# Patient Record
Sex: FEMALE | Race: WHITE | Employment: OTHER | RURAL
[De-identification: names, ages, dates, MRNs, and addresses within clinical notes are randomized per-mention and may not be internally consistent; named-entity substitution may affect disease eponyms.]

---

## 2017-04-10 ENCOUNTER — HOSPITAL ENCOUNTER (OUTPATIENT)
Dept: OTHER | Age: 57
Discharge: HOME | End: 2017-04-10
Attending: NURSE PRACTITIONER | Admitting: NURSE PRACTITIONER

## 2017-04-10 LAB
A/G RATIO: 2 (ref 0.8–2)
ALBUMIN SERPL-MCNC: 4.5 G/DL (ref 3.4–4.8)
ALP BLD-CCNC: 81 U/L (ref 25–100)
ALT SERPL-CCNC: 30 U/L (ref 4–36)
ANION GAP SERPL CALCULATED.3IONS-SCNC: 13 MMOL/L (ref 3–16)
AST SERPL-CCNC: 18 U/L (ref 8–33)
BASOPHILS ABSOLUTE: 0 K/UL (ref 0–0.1)
BASOPHILS RELATIVE PERCENT: 0.4 %
BILIRUB SERPL-MCNC: 0.3 MG/DL (ref 0.3–1.2)
BUN BLDV-MCNC: 13 MG/DL (ref 6–20)
CALCIUM SERPL-MCNC: 9.9 MG/DL (ref 8.5–10.5)
CHLORIDE BLD-SCNC: 103 MMOL/L (ref 98–107)
CHOLESTEROL, TOTAL: 152 MG/DL (ref 0–200)
CO2: 26 MMOL/L (ref 20–30)
CREAT SERPL-MCNC: 1 MG/DL (ref 0.4–1.2)
EOSINOPHILS ABSOLUTE: 0.5 K/UL (ref 0–0.4)
EOSINOPHILS RELATIVE PERCENT: 4.5 %
FOLATE: >20 NG/ML
GFR AFRICAN AMERICAN: >59
GFR NON-AFRICAN AMERICAN: 58
GLOBULIN: 2.3 G/DL
GLUCOSE BLD-MCNC: 96 MG/DL (ref 74–106)
HCT VFR BLD CALC: 37.2 % (ref 37–47)
HDLC SERPL-MCNC: 42 MG/DL (ref 40–60)
HEMOGLOBIN: 12.3 G/DL (ref 11.5–16.5)
LDL CHOLESTEROL CALCULATED: 67 MG/DL
LYMPHOCYTES ABSOLUTE: 3.2 K/UL (ref 1.5–4)
LYMPHOCYTES RELATIVE PERCENT: 30.6 % (ref 20–40)
MCH RBC QN AUTO: 31.4 PG (ref 27–32)
MCHC RBC AUTO-ENTMCNC: 33.1 G/DL (ref 31–35)
MCV RBC AUTO: 94.9 FL (ref 80–100)
MONOCYTES ABSOLUTE: 0.7 K/UL (ref 0.2–0.8)
MONOCYTES RELATIVE PERCENT: 6.3 % (ref 3–10)
NEUTROPHILS ABSOLUTE: 6.2 K/UL (ref 2–7.5)
NEUTROPHILS RELATIVE PERCENT: 58.2 %
PDW BLD-RTO: 15.6 % (ref 11–16)
PLATELET # BLD: 262 K/UL (ref 150–400)
PMV BLD AUTO: 10.3 FL (ref 6–10)
POTASSIUM SERPL-SCNC: 4.5 MMOL/L (ref 3.4–5.1)
RBC # BLD: 3.92 M/UL (ref 3.8–5.8)
SODIUM BLD-SCNC: 142 MMOL/L (ref 136–145)
TOTAL PROTEIN: 6.8 G/DL (ref 6.4–8.3)
TRIGL SERPL-MCNC: 213 MG/DL (ref 0–249)
TSH SERPL DL<=0.05 MIU/L-ACNC: 3.21 UIU/ML (ref 0.35–5.5)
VITAMIN B-12: 398 PG/ML (ref 211–911)
VLDLC SERPL CALC-MCNC: 43 MG/DL
WBC # BLD: 10.6 K/UL (ref 4–11)

## 2017-08-30 ENCOUNTER — HOSPITAL ENCOUNTER (OUTPATIENT)
Dept: OTHER | Age: 57
Discharge: HOME | End: 2017-08-30
Attending: NURSE PRACTITIONER | Admitting: NURSE PRACTITIONER

## 2017-08-30 DIAGNOSIS — M54.5 LOW BACK PAIN, UNSPECIFIED BACK PAIN LATERALITY, UNSPECIFIED CHRONICITY, WITH SCIATICA PRESENCE UNSPECIFIED: ICD-10-CM

## 2017-08-30 LAB
A/G RATIO: 1.8 (ref 0.8–2)
ALBUMIN SERPL-MCNC: 4.3 G/DL (ref 3.4–4.8)
ALP BLD-CCNC: 73 U/L (ref 25–100)
ALT SERPL-CCNC: 25 U/L (ref 4–36)
ANION GAP SERPL CALCULATED.3IONS-SCNC: 14 MMOL/L (ref 3–16)
AST SERPL-CCNC: 19 U/L (ref 8–33)
BASOPHILS ABSOLUTE: 0.1 K/UL (ref 0–0.1)
BASOPHILS RELATIVE PERCENT: 0.7 %
BILIRUB SERPL-MCNC: <0.2 MG/DL (ref 0.3–1.2)
BUN BLDV-MCNC: 12 MG/DL (ref 6–20)
CALCIUM SERPL-MCNC: 9.5 MG/DL (ref 8.5–10.5)
CHLORIDE BLD-SCNC: 100 MMOL/L (ref 98–107)
CHOLESTEROL, TOTAL: 218 MG/DL (ref 0–200)
CO2: 25 MMOL/L (ref 20–30)
CREAT SERPL-MCNC: 0.8 MG/DL (ref 0.4–1.2)
EOSINOPHILS ABSOLUTE: 0.5 K/UL (ref 0–0.4)
EOSINOPHILS RELATIVE PERCENT: 5.1 %
FOLATE: >20 NG/ML
GFR AFRICAN AMERICAN: >59
GFR NON-AFRICAN AMERICAN: >60
GLOBULIN: 2.4 G/DL
GLUCOSE BLD-MCNC: 89 MG/DL (ref 74–106)
HCT VFR BLD CALC: 37.3 % (ref 37–47)
HDLC SERPL-MCNC: 40 MG/DL (ref 40–60)
HEMOGLOBIN: 12.3 G/DL (ref 11.5–16.5)
LDL CHOLESTEROL CALCULATED: 104 MG/DL
LYMPHOCYTES ABSOLUTE: 3.1 K/UL (ref 1.5–4)
LYMPHOCYTES RELATIVE PERCENT: 34.7 % (ref 20–40)
MCH RBC QN AUTO: 30.1 PG (ref 27–32)
MCHC RBC AUTO-ENTMCNC: 33 G/DL (ref 31–35)
MCV RBC AUTO: 91.4 FL (ref 80–100)
MONOCYTES ABSOLUTE: 0.5 K/UL (ref 0.2–0.8)
MONOCYTES RELATIVE PERCENT: 5.5 % (ref 3–10)
NEUTROPHILS ABSOLUTE: 4.8 K/UL (ref 2–7.5)
NEUTROPHILS RELATIVE PERCENT: 54 %
PDW BLD-RTO: 15.1 % (ref 11–16)
PLATELET # BLD: 242 K/UL (ref 150–400)
PMV BLD AUTO: 10.3 FL (ref 6–10)
POTASSIUM SERPL-SCNC: 4.2 MMOL/L (ref 3.4–5.1)
RBC # BLD: 4.08 M/UL (ref 3.8–5.8)
SODIUM BLD-SCNC: 139 MMOL/L (ref 136–145)
TOTAL PROTEIN: 6.7 G/DL (ref 6.4–8.3)
TRIGL SERPL-MCNC: 371 MG/DL (ref 0–249)
TSH SERPL DL<=0.05 MIU/L-ACNC: 1.91 UIU/ML (ref 0.35–5.5)
VITAMIN B-12: 435 PG/ML (ref 211–911)
VLDLC SERPL CALC-MCNC: 74 MG/DL
WBC # BLD: 8.8 K/UL (ref 4–11)

## 2017-09-01 LAB — URINE CULTURE, ROUTINE: NORMAL

## 2017-09-06 ENCOUNTER — HOSPITAL ENCOUNTER (OUTPATIENT)
Dept: GENERAL RADIOLOGY | Age: 57
Discharge: HOME | End: 2017-09-06
Attending: NURSE PRACTITIONER | Admitting: NURSE PRACTITIONER

## 2017-09-06 DIAGNOSIS — R10.30 LOWER ABDOMINAL PAIN: ICD-10-CM

## 2018-01-17 ENCOUNTER — HOSPITAL ENCOUNTER (OUTPATIENT)
Dept: OTHER | Age: 58
Discharge: HOME | End: 2018-01-17
Attending: NURSE PRACTITIONER | Admitting: NURSE PRACTITIONER

## 2018-01-17 LAB
A/G RATIO: 1.1 (ref 0.8–2)
ALBUMIN SERPL-MCNC: 3.8 G/DL (ref 3.4–4.8)
ALP BLD-CCNC: 113 U/L (ref 25–100)
ALT SERPL-CCNC: 28 U/L (ref 4–36)
ANION GAP SERPL CALCULATED.3IONS-SCNC: 13 MMOL/L (ref 3–16)
AST SERPL-CCNC: 17 U/L (ref 8–33)
BASOPHILS ABSOLUTE: 0.1 K/UL (ref 0–0.1)
BASOPHILS RELATIVE PERCENT: 0.5 %
BILIRUB SERPL-MCNC: 0.3 MG/DL (ref 0.3–1.2)
BUN BLDV-MCNC: 18 MG/DL (ref 6–20)
CALCIUM SERPL-MCNC: 9.8 MG/DL (ref 8.5–10.5)
CHLORIDE BLD-SCNC: 105 MMOL/L (ref 98–107)
CHOLESTEROL, TOTAL: 93 MG/DL (ref 0–200)
CO2: 27 MMOL/L (ref 20–30)
CREAT SERPL-MCNC: 0.9 MG/DL (ref 0.4–1.2)
EOSINOPHILS ABSOLUTE: 0.5 K/UL (ref 0–0.4)
EOSINOPHILS RELATIVE PERCENT: 4.1 %
FOLATE: 9.52 NG/ML
GFR AFRICAN AMERICAN: >59
GFR NON-AFRICAN AMERICAN: >60
GLOBULIN: 3.4 G/DL
GLUCOSE BLD-MCNC: 100 MG/DL (ref 74–106)
HCT VFR BLD CALC: 35.9 % (ref 37–47)
HDLC SERPL-MCNC: 35 MG/DL (ref 40–60)
HEMOGLOBIN: 10.5 G/DL (ref 11.5–16.5)
IMMATURE GRANULOCYTES #: 0 K/UL
IMMATURE GRANULOCYTES %: 0.3 % (ref 0–5)
LDL CHOLESTEROL CALCULATED: 20 MG/DL
LYMPHOCYTES ABSOLUTE: 3.2 K/UL (ref 1.5–4)
LYMPHOCYTES RELATIVE PERCENT: 29 %
MCH RBC QN AUTO: 26.9 PG (ref 27–32)
MCHC RBC AUTO-ENTMCNC: 29.2 G/DL (ref 31–35)
MCV RBC AUTO: 92.1 FL (ref 80–100)
MONOCYTES ABSOLUTE: 0.7 K/UL (ref 0.2–0.8)
MONOCYTES RELATIVE PERCENT: 6 %
NEUTROPHILS ABSOLUTE: 6.7 K/UL (ref 2–7.5)
NEUTROPHILS RELATIVE PERCENT: 60.1 %
PDW BLD-RTO: 14.8 % (ref 11–16)
PLATELET # BLD: 381 K/UL (ref 150–400)
PMV BLD AUTO: 10.2 FL (ref 6–10)
POTASSIUM SERPL-SCNC: 4 MMOL/L (ref 3.4–5.1)
RBC # BLD: 3.9 M/UL (ref 3.8–5.8)
SODIUM BLD-SCNC: 145 MMOL/L (ref 136–145)
TOTAL PROTEIN: 7.2 G/DL (ref 6.4–8.3)
TRIGL SERPL-MCNC: 188 MG/DL (ref 0–249)
TSH SERPL DL<=0.05 MIU/L-ACNC: 0.76 UIU/ML (ref 0.35–5.5)
VITAMIN B-12: 384 PG/ML (ref 211–911)
VITAMIN D 25-HYDROXY: 28.7 (ref 32–100)
VLDLC SERPL CALC-MCNC: 38 MG/DL
WBC # BLD: 11.1 K/UL (ref 4–11)

## 2018-05-22 ENCOUNTER — HOSPITAL ENCOUNTER (OUTPATIENT)
Dept: OTHER | Age: 58
Discharge: HOME | End: 2018-05-22

## 2018-05-22 LAB
A/G RATIO: 1.3 (ref 0.8–2)
ALBUMIN SERPL-MCNC: 3.9 G/DL (ref 3.4–4.8)
ALP BLD-CCNC: 100 U/L (ref 25–100)
ALT SERPL-CCNC: 12 U/L (ref 4–36)
ANION GAP SERPL CALCULATED.3IONS-SCNC: 16 MMOL/L (ref 3–16)
AST SERPL-CCNC: 12 U/L (ref 8–33)
BASOPHILS ABSOLUTE: 0.1 K/UL (ref 0–0.1)
BASOPHILS RELATIVE PERCENT: 0.5 %
BILIRUB SERPL-MCNC: 0.4 MG/DL (ref 0.3–1.2)
BUN BLDV-MCNC: 8 MG/DL (ref 6–20)
CALCIUM SERPL-MCNC: 9.7 MG/DL (ref 8.5–10.5)
CHLORIDE BLD-SCNC: 101 MMOL/L (ref 98–107)
CHOLESTEROL, TOTAL: 111 MG/DL (ref 0–200)
CO2: 26 MMOL/L (ref 20–30)
CREAT SERPL-MCNC: 0.9 MG/DL (ref 0.4–1.2)
EOSINOPHILS ABSOLUTE: 0.3 K/UL (ref 0–0.4)
EOSINOPHILS RELATIVE PERCENT: 2.7 %
FOLATE: >20 NG/ML
GFR AFRICAN AMERICAN: >59
GFR NON-AFRICAN AMERICAN: >60
GLOBULIN: 3 G/DL
GLUCOSE BLD-MCNC: 104 MG/DL (ref 74–106)
HCT VFR BLD CALC: 34.3 % (ref 37–47)
HDLC SERPL-MCNC: 50 MG/DL (ref 40–60)
HEMOGLOBIN: 10.3 G/DL (ref 11.5–16.5)
IMMATURE GRANULOCYTES #: 0.1 K/UL
IMMATURE GRANULOCYTES %: 0.4 % (ref 0–5)
LDL CHOLESTEROL CALCULATED: 34 MG/DL
LYMPHOCYTES ABSOLUTE: 2.5 K/UL (ref 1.5–4)
LYMPHOCYTES RELATIVE PERCENT: 21 %
MCH RBC QN AUTO: 25.8 PG (ref 27–32)
MCHC RBC AUTO-ENTMCNC: 30 G/DL (ref 31–35)
MCV RBC AUTO: 86 FL (ref 80–100)
MONOCYTES ABSOLUTE: 0.8 K/UL (ref 0.2–0.8)
MONOCYTES RELATIVE PERCENT: 6.4 %
NEUTROPHILS ABSOLUTE: 8.3 K/UL (ref 2–7.5)
NEUTROPHILS RELATIVE PERCENT: 69 %
PDW BLD-RTO: 18.1 % (ref 11–16)
PLATELET # BLD: 419 K/UL (ref 150–400)
PMV BLD AUTO: 9.5 FL (ref 6–10)
POTASSIUM SERPL-SCNC: 4.1 MMOL/L (ref 3.4–5.1)
RBC # BLD: 3.99 M/UL (ref 3.8–5.8)
SODIUM BLD-SCNC: 143 MMOL/L (ref 136–145)
TOTAL PROTEIN: 6.9 G/DL (ref 6.4–8.3)
TRIGL SERPL-MCNC: 134 MG/DL (ref 0–249)
TSH SERPL DL<=0.05 MIU/L-ACNC: 0.83 UIU/ML (ref 0.35–5.5)
VITAMIN B-12: 473 PG/ML (ref 211–911)
VLDLC SERPL CALC-MCNC: 27 MG/DL
WBC # BLD: 12 K/UL (ref 4–11)

## 2018-09-24 ENCOUNTER — HOSPITAL ENCOUNTER (OUTPATIENT)
Facility: HOSPITAL | Age: 58
Discharge: HOME | End: 2018-09-24
Payer: MEDICAID

## 2018-09-24 LAB
A/G RATIO: 1.3 (ref 0.8–2)
ALBUMIN SERPL-MCNC: 4.1 G/DL (ref 3.4–4.8)
ALP BLD-CCNC: 126 U/L (ref 25–100)
ALT SERPL-CCNC: 29 U/L (ref 4–36)
ANION GAP SERPL CALCULATED.3IONS-SCNC: 14 MMOL/L (ref 3–16)
AST SERPL-CCNC: 29 U/L (ref 8–33)
BASOPHILS ABSOLUTE: 0.1 K/UL (ref 0–0.1)
BASOPHILS RELATIVE PERCENT: 0.4 %
BILIRUB SERPL-MCNC: 0.4 MG/DL (ref 0.3–1.2)
BUN BLDV-MCNC: 12 MG/DL (ref 6–20)
CALCIUM SERPL-MCNC: 9.9 MG/DL (ref 8.5–10.5)
CHLORIDE BLD-SCNC: 100 MMOL/L (ref 98–107)
CO2: 24 MMOL/L (ref 20–30)
CREAT SERPL-MCNC: 0.7 MG/DL (ref 0.4–1.2)
EOSINOPHILS ABSOLUTE: 0.2 K/UL (ref 0–0.4)
EOSINOPHILS RELATIVE PERCENT: 1.3 %
GFR AFRICAN AMERICAN: >59
GFR NON-AFRICAN AMERICAN: >60
GLOBULIN: 3.2 G/DL
GLUCOSE BLD-MCNC: 100 MG/DL (ref 74–106)
HCT VFR BLD CALC: 36.7 % (ref 37–47)
HEMOGLOBIN: 11.2 G/DL (ref 11.5–16.5)
IMMATURE GRANULOCYTES #: 0.1 K/UL
IMMATURE GRANULOCYTES %: 0.4 % (ref 0–5)
LYMPHOCYTES ABSOLUTE: 1.9 K/UL (ref 1.5–4)
LYMPHOCYTES RELATIVE PERCENT: 14.7 %
MCH RBC QN AUTO: 26.8 PG (ref 27–32)
MCHC RBC AUTO-ENTMCNC: 30.5 G/DL (ref 31–35)
MCV RBC AUTO: 87.8 FL (ref 80–100)
MONOCYTES ABSOLUTE: 0.9 K/UL (ref 0.2–0.8)
MONOCYTES RELATIVE PERCENT: 7.1 %
NEUTROPHILS ABSOLUTE: 9.9 K/UL (ref 2–7.5)
NEUTROPHILS RELATIVE PERCENT: 76.1 %
PDW BLD-RTO: 17.9 % (ref 11–16)
PLATELET # BLD: 329 K/UL (ref 150–400)
PMV BLD AUTO: 10.1 FL (ref 6–10)
POTASSIUM SERPL-SCNC: 4.4 MMOL/L (ref 3.4–5.1)
RBC # BLD: 4.18 M/UL (ref 3.8–5.8)
SODIUM BLD-SCNC: 138 MMOL/L (ref 136–145)
TOTAL PROTEIN: 7.3 G/DL (ref 6.4–8.3)
WBC # BLD: 13 K/UL (ref 4–11)

## 2018-09-24 PROCEDURE — 85025 COMPLETE CBC W/AUTO DIFF WBC: CPT

## 2018-09-24 PROCEDURE — 36415 COLL VENOUS BLD VENIPUNCTURE: CPT

## 2018-09-24 PROCEDURE — 80053 COMPREHEN METABOLIC PANEL: CPT

## 2018-10-31 ENCOUNTER — HOSPITAL ENCOUNTER (OUTPATIENT)
Facility: HOSPITAL | Age: 58
Discharge: HOME | End: 2018-10-31
Payer: MEDICAID

## 2018-10-31 PROCEDURE — 87070 CULTURE OTHR SPECIMN AEROBIC: CPT

## 2018-10-31 PROCEDURE — 87205 SMEAR GRAM STAIN: CPT

## 2018-11-04 LAB
GRAM STAIN RESULT: ABNORMAL
ORGANISM: ABNORMAL
WOUND/ABSCESS: ABNORMAL
WOUND/ABSCESS: ABNORMAL

## 2018-11-28 ENCOUNTER — HOSPITAL ENCOUNTER (OUTPATIENT)
Facility: HOSPITAL | Age: 58
Discharge: HOME | End: 2018-11-28
Payer: MEDICAID

## 2018-11-28 PROCEDURE — 87070 CULTURE OTHR SPECIMN AEROBIC: CPT

## 2018-11-28 PROCEDURE — 87186 SC STD MICRODIL/AGAR DIL: CPT

## 2018-11-28 PROCEDURE — 87205 SMEAR GRAM STAIN: CPT

## 2018-11-28 PROCEDURE — 87077 CULTURE AEROBIC IDENTIFY: CPT

## 2018-12-01 LAB
GRAM STAIN RESULT: ABNORMAL
ORGANISM: ABNORMAL
WOUND/ABSCESS: ABNORMAL
WOUND/ABSCESS: ABNORMAL

## 2018-12-05 ENCOUNTER — HOSPITAL ENCOUNTER (OUTPATIENT)
Facility: HOSPITAL | Age: 58
Discharge: HOME | End: 2018-12-05
Payer: MEDICAID

## 2018-12-05 PROCEDURE — 82746 ASSAY OF FOLIC ACID SERUM: CPT

## 2018-12-05 PROCEDURE — 84443 ASSAY THYROID STIM HORMONE: CPT

## 2018-12-05 PROCEDURE — 80061 LIPID PANEL: CPT

## 2018-12-05 PROCEDURE — 36415 COLL VENOUS BLD VENIPUNCTURE: CPT

## 2018-12-05 PROCEDURE — 82607 VITAMIN B-12: CPT

## 2018-12-05 PROCEDURE — 85025 COMPLETE CBC W/AUTO DIFF WBC: CPT

## 2018-12-05 PROCEDURE — 80053 COMPREHEN METABOLIC PANEL: CPT

## 2018-12-06 LAB
A/G RATIO: 1.4 (ref 0.8–2)
ALBUMIN SERPL-MCNC: 3.8 G/DL (ref 3.4–4.8)
ALP BLD-CCNC: 96 U/L (ref 25–100)
ALT SERPL-CCNC: 17 U/L (ref 4–36)
ANION GAP SERPL CALCULATED.3IONS-SCNC: 15 MMOL/L (ref 3–16)
AST SERPL-CCNC: 15 U/L (ref 8–33)
BASOPHILS ABSOLUTE: 0.1 K/UL (ref 0–0.1)
BASOPHILS RELATIVE PERCENT: 0.5 %
BILIRUB SERPL-MCNC: <0.2 MG/DL (ref 0.3–1.2)
BUN BLDV-MCNC: 11 MG/DL (ref 6–20)
CALCIUM SERPL-MCNC: 9.7 MG/DL (ref 8.5–10.5)
CHLORIDE BLD-SCNC: 102 MMOL/L (ref 98–107)
CHOLESTEROL, TOTAL: 103 MG/DL (ref 0–200)
CO2: 24 MMOL/L (ref 20–30)
CREAT SERPL-MCNC: 0.7 MG/DL (ref 0.4–1.2)
EOSINOPHILS ABSOLUTE: 0.3 K/UL (ref 0–0.4)
EOSINOPHILS RELATIVE PERCENT: 2.4 %
FOLATE: 15.64 NG/ML
GFR AFRICAN AMERICAN: >59
GFR NON-AFRICAN AMERICAN: >60
GLOBULIN: 2.7 G/DL
GLUCOSE BLD-MCNC: 95 MG/DL (ref 74–106)
HCT VFR BLD CALC: 36.4 % (ref 37–47)
HDLC SERPL-MCNC: 33 MG/DL (ref 40–60)
HEMOGLOBIN: 11.2 G/DL (ref 11.5–16.5)
IMMATURE GRANULOCYTES #: 0 K/UL
IMMATURE GRANULOCYTES %: 0.3 % (ref 0–5)
LDL CHOLESTEROL CALCULATED: 35 MG/DL
LYMPHOCYTES ABSOLUTE: 3.4 K/UL (ref 1.5–4)
LYMPHOCYTES RELATIVE PERCENT: 29.6 %
MCH RBC QN AUTO: 27.9 PG (ref 27–32)
MCHC RBC AUTO-ENTMCNC: 30.8 G/DL (ref 31–35)
MCV RBC AUTO: 90.8 FL (ref 80–100)
MONOCYTES ABSOLUTE: 0.6 K/UL (ref 0.2–0.8)
MONOCYTES RELATIVE PERCENT: 4.8 %
NEUTROPHILS ABSOLUTE: 7.1 K/UL (ref 2–7.5)
NEUTROPHILS RELATIVE PERCENT: 62.4 %
PDW BLD-RTO: 16.8 % (ref 11–16)
PLATELET # BLD: 359 K/UL (ref 150–400)
PMV BLD AUTO: 10.1 FL (ref 6–10)
POTASSIUM SERPL-SCNC: 4.5 MMOL/L (ref 3.4–5.1)
RBC # BLD: 4.01 M/UL (ref 3.8–5.8)
SODIUM BLD-SCNC: 141 MMOL/L (ref 136–145)
TOTAL PROTEIN: 6.5 G/DL (ref 6.4–8.3)
TRIGL SERPL-MCNC: 177 MG/DL (ref 0–249)
TSH SERPL DL<=0.05 MIU/L-ACNC: 0.76 UIU/ML (ref 0.35–5.5)
VITAMIN B-12: 364 PG/ML (ref 211–911)
VLDLC SERPL CALC-MCNC: 35 MG/DL
WBC # BLD: 11.5 K/UL (ref 4–11)

## 2019-03-20 ENCOUNTER — HOSPITAL ENCOUNTER (OUTPATIENT)
Facility: HOSPITAL | Age: 59
Discharge: HOME | End: 2019-03-20
Payer: MEDICAID

## 2019-03-20 LAB
RAPID INFLUENZA  B AGN: NEGATIVE
RAPID INFLUENZA A AGN: NEGATIVE

## 2019-03-20 PROCEDURE — 87804 INFLUENZA ASSAY W/OPTIC: CPT

## 2019-05-03 ENCOUNTER — HOSPITAL ENCOUNTER (OUTPATIENT)
Facility: HOSPITAL | Age: 59
Discharge: HOME | End: 2019-05-03
Payer: MEDICAID

## 2019-05-03 LAB
A/G RATIO: 1.7 (ref 0.8–2)
ALBUMIN SERPL-MCNC: 4.2 G/DL (ref 3.4–4.8)
ALP BLD-CCNC: 96 U/L (ref 25–100)
ALT SERPL-CCNC: 14 U/L (ref 4–36)
ANION GAP SERPL CALCULATED.3IONS-SCNC: 13 MMOL/L (ref 3–16)
AST SERPL-CCNC: 13 U/L (ref 8–33)
BASOPHILS ABSOLUTE: 0.1 K/UL (ref 0–0.1)
BASOPHILS RELATIVE PERCENT: 0.5 %
BILIRUB SERPL-MCNC: 0.4 MG/DL (ref 0.3–1.2)
BUN BLDV-MCNC: 14 MG/DL (ref 6–20)
CALCIUM SERPL-MCNC: 9.8 MG/DL (ref 8.5–10.5)
CHLORIDE BLD-SCNC: 107 MMOL/L (ref 98–107)
CHOLESTEROL, TOTAL: 123 MG/DL (ref 0–200)
CO2: 21 MMOL/L (ref 20–30)
CREAT SERPL-MCNC: 0.8 MG/DL (ref 0.4–1.2)
EOSINOPHILS ABSOLUTE: 0.3 K/UL (ref 0–0.4)
EOSINOPHILS RELATIVE PERCENT: 2.2 %
FOLATE: >20 NG/ML
GFR AFRICAN AMERICAN: >59
GFR NON-AFRICAN AMERICAN: >60
GLOBULIN: 2.5 G/DL
GLUCOSE BLD-MCNC: 89 MG/DL (ref 74–106)
HCT VFR BLD CALC: 38.7 % (ref 37–47)
HDLC SERPL-MCNC: 38 MG/DL (ref 40–60)
HEMOGLOBIN: 12.1 G/DL (ref 11.5–16.5)
IMMATURE GRANULOCYTES #: 0.1 K/UL
IMMATURE GRANULOCYTES %: 0.5 % (ref 0–5)
LDL CHOLESTEROL CALCULATED: 45 MG/DL
LYMPHOCYTES ABSOLUTE: 3.3 K/UL (ref 1.5–4)
LYMPHOCYTES RELATIVE PERCENT: 25.5 %
MCH RBC QN AUTO: 29.3 PG (ref 27–32)
MCHC RBC AUTO-ENTMCNC: 31.3 G/DL (ref 31–35)
MCV RBC AUTO: 93.7 FL (ref 80–100)
MONOCYTES ABSOLUTE: 0.8 K/UL (ref 0.2–0.8)
MONOCYTES RELATIVE PERCENT: 6 %
NEUTROPHILS ABSOLUTE: 8.5 K/UL (ref 2–7.5)
NEUTROPHILS RELATIVE PERCENT: 65.3 %
PDW BLD-RTO: 16.1 % (ref 11–16)
PLATELET # BLD: 333 K/UL (ref 150–400)
PMV BLD AUTO: 10.1 FL (ref 6–10)
POTASSIUM SERPL-SCNC: 4.6 MMOL/L (ref 3.4–5.1)
RBC # BLD: 4.13 M/UL (ref 3.8–5.8)
SODIUM BLD-SCNC: 141 MMOL/L (ref 136–145)
TOTAL PROTEIN: 6.7 G/DL (ref 6.4–8.3)
TRIGL SERPL-MCNC: 199 MG/DL (ref 0–249)
TSH SERPL DL<=0.05 MIU/L-ACNC: 0.62 UIU/ML (ref 0.35–5.5)
VITAMIN B-12: 319 PG/ML (ref 211–911)
VLDLC SERPL CALC-MCNC: 40 MG/DL
WBC # BLD: 13 K/UL (ref 4–11)

## 2019-05-03 PROCEDURE — 36415 COLL VENOUS BLD VENIPUNCTURE: CPT

## 2019-05-03 PROCEDURE — 85025 COMPLETE CBC W/AUTO DIFF WBC: CPT

## 2019-05-03 PROCEDURE — 82746 ASSAY OF FOLIC ACID SERUM: CPT

## 2019-05-03 PROCEDURE — 84443 ASSAY THYROID STIM HORMONE: CPT

## 2019-05-03 PROCEDURE — 82607 VITAMIN B-12: CPT

## 2019-05-03 PROCEDURE — 80061 LIPID PANEL: CPT

## 2019-05-03 PROCEDURE — 80053 COMPREHEN METABOLIC PANEL: CPT

## 2019-08-21 ENCOUNTER — HOSPITAL ENCOUNTER (OUTPATIENT)
Facility: HOSPITAL | Age: 59
Discharge: HOME | End: 2019-08-21
Payer: MEDICAID

## 2019-08-21 LAB
A/G RATIO: 1.6 (ref 0.8–2)
ALBUMIN SERPL-MCNC: 4.1 G/DL (ref 3.4–4.8)
ALP BLD-CCNC: 106 U/L (ref 25–100)
ALT SERPL-CCNC: 19 U/L (ref 4–36)
ANION GAP SERPL CALCULATED.3IONS-SCNC: 14 MMOL/L (ref 3–16)
AST SERPL-CCNC: 17 U/L (ref 8–33)
BASOPHILS ABSOLUTE: 0.1 K/UL (ref 0–0.1)
BASOPHILS RELATIVE PERCENT: 0.5 %
BILIRUB SERPL-MCNC: 0.3 MG/DL (ref 0.3–1.2)
BUN BLDV-MCNC: 10 MG/DL (ref 6–20)
CALCIUM SERPL-MCNC: 9.7 MG/DL (ref 8.5–10.5)
CHLORIDE BLD-SCNC: 102 MMOL/L (ref 98–107)
CHOLESTEROL, TOTAL: 114 MG/DL (ref 0–200)
CO2: 24 MMOL/L (ref 20–30)
CREAT SERPL-MCNC: 0.7 MG/DL (ref 0.4–1.2)
EOSINOPHILS ABSOLUTE: 0.5 K/UL (ref 0–0.4)
EOSINOPHILS RELATIVE PERCENT: 4.2 %
FOLATE: >20 NG/ML
GFR AFRICAN AMERICAN: >59
GFR NON-AFRICAN AMERICAN: >60
GLOBULIN: 2.6 G/DL
GLUCOSE BLD-MCNC: 106 MG/DL (ref 74–106)
HCT VFR BLD CALC: 39.2 % (ref 37–47)
HDLC SERPL-MCNC: 35 MG/DL (ref 40–60)
HEMOGLOBIN: 12.5 G/DL (ref 11.5–16.5)
IMMATURE GRANULOCYTES #: 0.1 K/UL
IMMATURE GRANULOCYTES %: 0.4 % (ref 0–5)
LDL CHOLESTEROL CALCULATED: 35 MG/DL
LYMPHOCYTES ABSOLUTE: 3.2 K/UL (ref 1.5–4)
LYMPHOCYTES RELATIVE PERCENT: 28.3 %
MCH RBC QN AUTO: 29.4 PG (ref 27–32)
MCHC RBC AUTO-ENTMCNC: 31.9 G/DL (ref 31–35)
MCV RBC AUTO: 92.2 FL (ref 80–100)
MONOCYTES ABSOLUTE: 0.6 K/UL (ref 0.2–0.8)
MONOCYTES RELATIVE PERCENT: 4.8 %
NEUTROPHILS ABSOLUTE: 7 K/UL (ref 2–7.5)
NEUTROPHILS RELATIVE PERCENT: 61.8 %
PDW BLD-RTO: 16.1 % (ref 11–16)
PLATELET # BLD: 350 K/UL (ref 150–400)
PMV BLD AUTO: 10.2 FL (ref 6–10)
POTASSIUM SERPL-SCNC: 4.6 MMOL/L (ref 3.4–5.1)
RBC # BLD: 4.25 M/UL (ref 3.8–5.8)
SODIUM BLD-SCNC: 140 MMOL/L (ref 136–145)
TOTAL PROTEIN: 6.7 G/DL (ref 6.4–8.3)
TRIGL SERPL-MCNC: 222 MG/DL (ref 0–249)
TSH SERPL DL<=0.05 MIU/L-ACNC: 0.89 UIU/ML (ref 0.35–5.5)
VITAMIN B-12: 336 PG/ML (ref 211–911)
VLDLC SERPL CALC-MCNC: 44 MG/DL
WBC # BLD: 11.4 K/UL (ref 4–11)

## 2019-08-21 PROCEDURE — 82746 ASSAY OF FOLIC ACID SERUM: CPT

## 2019-08-21 PROCEDURE — 36415 COLL VENOUS BLD VENIPUNCTURE: CPT

## 2019-08-21 PROCEDURE — 80061 LIPID PANEL: CPT

## 2019-08-21 PROCEDURE — 85025 COMPLETE CBC W/AUTO DIFF WBC: CPT

## 2019-08-21 PROCEDURE — 82607 VITAMIN B-12: CPT

## 2019-08-21 PROCEDURE — 80053 COMPREHEN METABOLIC PANEL: CPT

## 2019-08-21 PROCEDURE — 84443 ASSAY THYROID STIM HORMONE: CPT

## 2020-01-08 ENCOUNTER — HOSPITAL ENCOUNTER (OUTPATIENT)
Facility: HOSPITAL | Age: 60
Discharge: HOME | End: 2020-01-08
Payer: MEDICAID

## 2020-01-08 LAB
RAPID INFLUENZA  B AGN: NEGATIVE
RAPID INFLUENZA A AGN: NEGATIVE

## 2020-01-08 PROCEDURE — 87804 INFLUENZA ASSAY W/OPTIC: CPT

## 2020-01-27 ENCOUNTER — HOSPITAL ENCOUNTER (EMERGENCY)
Facility: HOSPITAL | Age: 60
Discharge: ANOTHER HOSPITAL | End: 2020-01-30
Attending: EMERGENCY MEDICINE
Payer: MEDICAID

## 2020-01-27 ENCOUNTER — APPOINTMENT (OUTPATIENT)
Dept: CT IMAGING | Facility: HOSPITAL | Age: 60
End: 2020-01-27
Payer: MEDICAID

## 2020-01-27 LAB
A/G RATIO: 0.8 (ref 0.8–2)
ALBUMIN SERPL-MCNC: 2.7 G/DL (ref 3.4–4.8)
ALP BLD-CCNC: 158 U/L (ref 25–100)
ALT SERPL-CCNC: 14 U/L (ref 4–36)
ANION GAP SERPL CALCULATED.3IONS-SCNC: 12 MMOL/L (ref 3–16)
AST SERPL-CCNC: 15 U/L (ref 8–33)
BASOPHILS ABSOLUTE: 0.1 K/UL (ref 0–0.1)
BASOPHILS RELATIVE PERCENT: 0.6 %
BILIRUB SERPL-MCNC: 1.9 MG/DL (ref 0.3–1.2)
BUN BLDV-MCNC: 26 MG/DL (ref 6–20)
CALCIUM SERPL-MCNC: 8.8 MG/DL (ref 8.5–10.5)
CHLORIDE BLD-SCNC: 101 MMOL/L (ref 98–107)
CO2: 19 MMOL/L (ref 20–30)
CREAT SERPL-MCNC: 1.1 MG/DL (ref 0.4–1.2)
EOSINOPHILS ABSOLUTE: 0 K/UL (ref 0–0.4)
EOSINOPHILS RELATIVE PERCENT: 0.3 %
GFR AFRICAN AMERICAN: >59
GFR NON-AFRICAN AMERICAN: 51
GLOBULIN: 3.6 G/DL
GLUCOSE BLD-MCNC: 111 MG/DL (ref 74–106)
HCT VFR BLD CALC: 30.8 % (ref 37–47)
HEMOGLOBIN: 10 G/DL (ref 11.5–16.5)
IMMATURE GRANULOCYTES #: 0.1 K/UL
IMMATURE GRANULOCYTES %: 1 % (ref 0–5)
LACTIC ACID: 1.8 MMOL/L (ref 0.4–2)
LYMPHOCYTES ABSOLUTE: 1.6 K/UL (ref 1.5–4)
LYMPHOCYTES RELATIVE PERCENT: 13.2 %
MCH RBC QN AUTO: 29.4 PG (ref 27–32)
MCHC RBC AUTO-ENTMCNC: 32.5 G/DL (ref 31–35)
MCV RBC AUTO: 90.6 FL (ref 80–100)
MONOCYTES ABSOLUTE: 2.1 K/UL (ref 0.2–0.8)
MONOCYTES RELATIVE PERCENT: 16.5 %
NEUTROPHILS ABSOLUTE: 8.5 K/UL (ref 2–7.5)
NEUTROPHILS RELATIVE PERCENT: 68.4 %
PDW BLD-RTO: 15.9 % (ref 11–16)
PLATELET # BLD: 316 K/UL (ref 150–400)
PMV BLD AUTO: 9.5 FL (ref 6–10)
POTASSIUM REFLEX MAGNESIUM: 4.1 MMOL/L (ref 3.4–5.1)
RBC # BLD: 3.4 M/UL (ref 3.8–5.8)
SODIUM BLD-SCNC: 132 MMOL/L (ref 136–145)
TOTAL PROTEIN: 6.3 G/DL (ref 6.4–8.3)
WBC # BLD: 12.4 K/UL (ref 4–11)

## 2020-01-27 PROCEDURE — 96366 THER/PROPH/DIAG IV INF ADDON: CPT

## 2020-01-27 PROCEDURE — 80053 COMPREHEN METABOLIC PANEL: CPT

## 2020-01-27 PROCEDURE — 6360000002 HC RX W HCPCS: Performed by: EMERGENCY MEDICINE

## 2020-01-27 PROCEDURE — 87186 SC STD MICRODIL/AGAR DIL: CPT

## 2020-01-27 PROCEDURE — 74177 CT ABD & PELVIS W/CONTRAST: CPT

## 2020-01-27 PROCEDURE — 36415 COLL VENOUS BLD VENIPUNCTURE: CPT

## 2020-01-27 PROCEDURE — 96376 TX/PRO/DX INJ SAME DRUG ADON: CPT

## 2020-01-27 PROCEDURE — 99285 EMERGENCY DEPT VISIT HI MDM: CPT

## 2020-01-27 PROCEDURE — 96367 TX/PROPH/DG ADDL SEQ IV INF: CPT

## 2020-01-27 PROCEDURE — 87077 CULTURE AEROBIC IDENTIFY: CPT

## 2020-01-27 PROCEDURE — 83605 ASSAY OF LACTIC ACID: CPT

## 2020-01-27 PROCEDURE — 2500000003 HC RX 250 WO HCPCS: Performed by: EMERGENCY MEDICINE

## 2020-01-27 PROCEDURE — 85025 COMPLETE CBC W/AUTO DIFF WBC: CPT

## 2020-01-27 PROCEDURE — 87040 BLOOD CULTURE FOR BACTERIA: CPT

## 2020-01-27 PROCEDURE — 87070 CULTURE OTHR SPECIMN AEROBIC: CPT

## 2020-01-27 PROCEDURE — 2580000003 HC RX 258: Performed by: EMERGENCY MEDICINE

## 2020-01-27 PROCEDURE — 96375 TX/PRO/DX INJ NEW DRUG ADDON: CPT

## 2020-01-27 PROCEDURE — 87205 SMEAR GRAM STAIN: CPT

## 2020-01-27 PROCEDURE — 96365 THER/PROPH/DIAG IV INF INIT: CPT

## 2020-01-27 PROCEDURE — 6360000004 HC RX CONTRAST MEDICATION: Performed by: EMERGENCY MEDICINE

## 2020-01-27 RX ORDER — HYDROCODONE BITARTRATE AND ACETAMINOPHEN 7.5; 325 MG/1; MG/1
TABLET ORAL 3 TIMES DAILY PRN
COMMUNITY
Start: 2017-08-30 | End: 2022-01-03

## 2020-01-27 RX ORDER — MORPHINE SULFATE 4 MG/ML
4 INJECTION, SOLUTION INTRAMUSCULAR; INTRAVENOUS ONCE
Status: COMPLETED | OUTPATIENT
Start: 2020-01-27 | End: 2020-01-27

## 2020-01-27 RX ORDER — ATORVASTATIN CALCIUM 40 MG/1
40 TABLET, FILM COATED ORAL DAILY
Status: ON HOLD | COMMUNITY
End: 2022-01-04

## 2020-01-27 RX ORDER — ONDANSETRON 2 MG/ML
4 INJECTION INTRAMUSCULAR; INTRAVENOUS ONCE
Status: COMPLETED | OUTPATIENT
Start: 2020-01-27 | End: 2020-01-27

## 2020-01-27 RX ORDER — 0.9 % SODIUM CHLORIDE 0.9 %
1000 INTRAVENOUS SOLUTION INTRAVENOUS ONCE
Status: COMPLETED | OUTPATIENT
Start: 2020-01-27 | End: 2020-01-27

## 2020-01-27 RX ORDER — ASPIRIN 81 MG/1
81 TABLET ORAL DAILY
COMMUNITY
Start: 2017-08-10

## 2020-01-27 RX ORDER — ALLOPURINOL 100 MG/1
100 TABLET ORAL DAILY
Status: ON HOLD | COMMUNITY
End: 2022-01-04

## 2020-01-27 RX ADMIN — CEFEPIME HYDROCHLORIDE 2 G: 2 INJECTION, POWDER, FOR SOLUTION INTRAVENOUS at 20:49

## 2020-01-27 RX ADMIN — MORPHINE SULFATE 4 MG: 4 INJECTION, SOLUTION INTRAMUSCULAR; INTRAVENOUS at 17:29

## 2020-01-27 RX ADMIN — ONDANSETRON 4 MG: 2 INJECTION INTRAMUSCULAR; INTRAVENOUS at 17:27

## 2020-01-27 RX ADMIN — SODIUM CHLORIDE 1000 ML: 9 INJECTION, SOLUTION INTRAVENOUS at 17:33

## 2020-01-27 RX ADMIN — IOPAMIDOL 100 ML: 755 INJECTION, SOLUTION INTRAVENOUS at 18:00

## 2020-01-27 RX ADMIN — METRONIDAZOLE 500 MG: 500 INJECTION, SOLUTION INTRAVENOUS at 19:44

## 2020-01-27 SDOH — HEALTH STABILITY: MENTAL HEALTH: HOW OFTEN DO YOU HAVE A DRINK CONTAINING ALCOHOL?: NEVER

## 2020-01-27 ASSESSMENT — PAIN DESCRIPTION - PAIN TYPE: TYPE: ACUTE PAIN

## 2020-01-27 ASSESSMENT — PAIN SCALES - GENERAL
PAINLEVEL_OUTOF10: 5
PAINLEVEL_OUTOF10: 7
PAINLEVEL_OUTOF10: 7

## 2020-01-27 ASSESSMENT — PAIN DESCRIPTION - PROGRESSION: CLINICAL_PROGRESSION: GRADUALLY WORSENING

## 2020-01-27 ASSESSMENT — PAIN DESCRIPTION - ORIENTATION: ORIENTATION: MID

## 2020-01-27 ASSESSMENT — PAIN DESCRIPTION - FREQUENCY: FREQUENCY: CONTINUOUS

## 2020-01-27 ASSESSMENT — PAIN DESCRIPTION - LOCATION: LOCATION: ABDOMEN

## 2020-01-27 ASSESSMENT — PAIN DESCRIPTION - ONSET: ONSET: GRADUAL

## 2020-01-27 ASSESSMENT — PAIN DESCRIPTION - DESCRIPTORS: DESCRIPTORS: CONSTANT

## 2020-01-28 PROCEDURE — 6360000002 HC RX W HCPCS: Performed by: EMERGENCY MEDICINE

## 2020-01-28 PROCEDURE — 6370000000 HC RX 637 (ALT 250 FOR IP): Performed by: EMERGENCY MEDICINE

## 2020-01-28 PROCEDURE — 96376 TX/PRO/DX INJ SAME DRUG ADON: CPT

## 2020-01-28 PROCEDURE — 2580000003 HC RX 258: Performed by: EMERGENCY MEDICINE

## 2020-01-28 PROCEDURE — 2500000003 HC RX 250 WO HCPCS: Performed by: EMERGENCY MEDICINE

## 2020-01-28 PROCEDURE — 96366 THER/PROPH/DIAG IV INF ADDON: CPT

## 2020-01-28 RX ORDER — MORPHINE SULFATE 4 MG/ML
4 INJECTION, SOLUTION INTRAMUSCULAR; INTRAVENOUS
Status: DISCONTINUED | OUTPATIENT
Start: 2020-01-28 | End: 2020-01-28

## 2020-01-28 RX ORDER — ATORVASTATIN CALCIUM 40 MG/1
40 TABLET, FILM COATED ORAL NIGHTLY
Status: DISCONTINUED | OUTPATIENT
Start: 2020-01-28 | End: 2020-01-30 | Stop reason: HOSPADM

## 2020-01-28 RX ORDER — ASPIRIN 81 MG/1
81 TABLET, CHEWABLE ORAL DAILY
Status: DISCONTINUED | OUTPATIENT
Start: 2020-01-28 | End: 2020-01-30 | Stop reason: HOSPADM

## 2020-01-28 RX ORDER — MORPHINE SULFATE 4 MG/ML
4 INJECTION, SOLUTION INTRAMUSCULAR; INTRAVENOUS EVERY 4 HOURS PRN
Status: DISCONTINUED | OUTPATIENT
Start: 2020-01-28 | End: 2020-01-30 | Stop reason: HOSPADM

## 2020-01-28 RX ADMIN — CEFEPIME HYDROCHLORIDE 2 G: 2 INJECTION, POWDER, FOR SOLUTION INTRAVENOUS at 19:27

## 2020-01-28 RX ADMIN — MORPHINE SULFATE 4 MG: 4 INJECTION INTRAVENOUS at 15:03

## 2020-01-28 RX ADMIN — ASPIRIN 81 MG 81 MG: 81 TABLET ORAL at 19:27

## 2020-01-28 RX ADMIN — MORPHINE SULFATE 4 MG: 4 INJECTION INTRAVENOUS at 23:56

## 2020-01-28 RX ADMIN — CEFEPIME HYDROCHLORIDE 2 G: 2 INJECTION, POWDER, FOR SOLUTION INTRAVENOUS at 07:38

## 2020-01-28 RX ADMIN — MORPHINE SULFATE 4 MG: 4 INJECTION, SOLUTION INTRAMUSCULAR; INTRAVENOUS at 06:27

## 2020-01-28 RX ADMIN — MORPHINE SULFATE 4 MG: 4 INJECTION, SOLUTION INTRAMUSCULAR; INTRAVENOUS at 11:08

## 2020-01-28 RX ADMIN — ATORVASTATIN CALCIUM 40 MG: 40 TABLET, FILM COATED ORAL at 19:28

## 2020-01-28 RX ADMIN — MORPHINE SULFATE 4 MG: 4 INJECTION INTRAVENOUS at 19:28

## 2020-01-28 RX ADMIN — METRONIDAZOLE 500 MG: 500 INJECTION, SOLUTION INTRAVENOUS at 22:52

## 2020-01-28 RX ADMIN — METRONIDAZOLE 500 MG: 500 INJECTION, SOLUTION INTRAVENOUS at 14:05

## 2020-01-28 RX ADMIN — METRONIDAZOLE 500 MG: 500 INJECTION, SOLUTION INTRAVENOUS at 06:29

## 2020-01-28 RX ADMIN — MORPHINE SULFATE 4 MG: 4 INJECTION, SOLUTION INTRAMUSCULAR; INTRAVENOUS at 00:58

## 2020-01-28 ASSESSMENT — PAIN SCALES - GENERAL
PAINLEVEL_OUTOF10: 7
PAINLEVEL_OUTOF10: 7
PAINLEVEL_OUTOF10: 8
PAINLEVEL_OUTOF10: 7
PAINLEVEL_OUTOF10: 7
PAINLEVEL_OUTOF10: 8

## 2020-01-28 NOTE — ED NOTES
Pt resting in bed at this time. Pt report received from Jodi Aviles RN. Will continue to maintain NPO status and will monitor pt while boarding in ER for a bed assignment at [facility].       Vlad Beckett RN  01/28/20 0800

## 2020-01-28 NOTE — ED NOTES
Dr Jorgito Browning called [facility] to check on bed status, but bed not available.      Arlene Wick, RN  01/28/20 2103

## 2020-01-28 NOTE — ED PROVIDER NOTES
and morphine in the ED with good symptomatic relief. Patient was reassessed as noted above . Patient has leukocytosis without evidence of lactic acidosis. CT imaging is concerning for developing fistula tracts. I discussed symptoms with [facility] general surgeon on-call. Patient will be accepted pending a bed. We discussed antibiotic use at this time and agreed on cefepime and Flagyl. Patient will be placed in op status in the emergency department pending transfer. I discussed possible admission to hospital here, but hospitalist did not feel admission to be appropriate at this time. . Plan of care discussed with patient and family. Patient and family in agreement with plan. Patient was given scripts for the following medications. I counseled patient how to take these medications. New Prescriptions    No medications on file       CLINICAL IMPRESSION  1. Abdominal wall fistula        Blood pressure 110/64, pulse 89, temperature 99 °F (37.2 °C), temperature source Oral, resp. rate 16, height 5' 2\" (1.575 m), weight 190 lb (86.2 kg), SpO2 98 %. DISPOSITION  Catherine Modi was transferred in stable condition.         Ashley Braun,   01/28/20 0233

## 2020-01-28 NOTE — ED NOTES
Spoke with [facility] at this time regarding bed status, no bed available at this time.       Vinny Millan RN  01/28/20 5635

## 2020-01-28 NOTE — ED NOTES
Patient rates pain as 8 of 10. Morphine 4mg IV given per prn order. Dr Joleen Null aware.      Farheen Larios, RN  01/28/20 5966

## 2020-01-28 NOTE — ED PROVIDER NOTES
Result Value Ref Range    Sodium 132 (L) 136 - 145 mmol/L    Potassium reflex Magnesium 4.1 3.4 - 5.1 mmol/L    Chloride 101 98 - 107 mmol/L    CO2 19 (L) 20 - 30 mmol/L    Anion Gap 12 3 - 16    Glucose 111 (H) 74 - 106 mg/dL    BUN 26 (H) 6 - 20 mg/dL    CREATININE 1.1 0.4 - 1.2 mg/dL    GFR Non- 51 (L) >59    GFR African American >59 >59    Calcium 8.8 8.5 - 10.5 mg/dL    Total Protein 6.3 (L) 6.4 - 8.3 g/dL    Alb 2.7 (L) 3.4 - 4.8 g/dL    Albumin/Globulin Ratio 0.8 0.8 - 2.0    Total Bilirubin 1.9 (H) 0.3 - 1.2 mg/dL    Alkaline Phosphatase 158 (H) 25 - 100 U/L    ALT 14 4 - 36 U/L    AST 15 8 - 33 U/L    Globulin 3.6 g/dL     Ct Abdomen Pelvis W Iv Contrast Additional Contrast? None    Result Date: 1/27/2020  CT OF THE ABDOMEN AND PELVIS WITH CONTRAST 1/27/2020 HISTORY:  Abdominal pain, infection, fistula suspected, previous history of hernia repair cholecystectomy hysterectomy and abdominal surgery PROCEDURE: Dose modulation, radiation reducing techniques and iterative reconstruction techniques utilized to reduce radiation exposure with up-to-date CT equipment. . 5 mm axial sections were obtained and coronal and sagittal 3 mm reconstructed sequences evaluated on separate computerized workstations. Study performed Following contrast infusion with 100 mL of Isovue-370 contrast material. Oral contrast was also administered. COMPARISON STUDIES: September 6, 2017 prior studies FINDINGS: CT ABDOMEN: The lung bases reveal some linear change right middle lobe which could be atelectasis or pneumonia. Scattered granulomata are noted in both lower lobes with no pleural effusion. There is fatty infiltration and mild enlargement of the liver. Measuring 20 cm in length The spleen is of normal size. Scattered granulomata are noted within the spleen and some surgical clips noted left upper quadrant in the mesentery with previous bowel resection and anastomosis.  IVC filter is in place in satisfactory position below the renal veins The adrenal glands are slightly thickened with low-attenuation unchanged compared to prior study which could represent adrenal adenomas, as noted on the left side measuring up to 3 cm x 1.6 cm on image 58. The gallbladder has been resected. The pancreas is normal in appearance. No ductal dilation. The right and left kidney excrete contrast without delay or hydronephrosis. The aorta is of normal caliber without aneurysm. There is no sign of free air or free fluid. Anterior abdominal wall prior surgery and hernia repair noted with fistula of bowel, there appears to be small bowel extending out on either side of the midline, to the right of midline on image 119 with gas noted extending into the rectus sheath and into the right lower abdomen likely fistulous communication and free air indicating abscess cavity as seen on image 145. Fluid collection on the right side of the lower abdomen without wall contains gas is well measuring up to 6.8 x 4.4 cm. Central fluid collection at or below the umbilicus along the rectus sheath and superficial measures at least 6.8 x 3.6 cm and on sagittal sequences is abnormal connection between the 2 collections most likely. Overall length of the gas and abnormal fluid is 10 cm as seen on sagittal image 94 The left side small bowel appears technique 8 to the region of abnormal fluid collection as seen on images 135 through 145 which may be contributing to the fistula Some fluid filled loops of small bowel are noted. Anastomotic changes noted in the region of the upper rectum or sigmoid colon CT THE PELVIS: CT pelvis performed demonstrates a mild amount of stool throughout the colon. There is no intraperitoneal dominant free fluid collection with anastomotic changes noted in the region the rectum and upper rectum or sigmoid region with moderate stool in the region of the colon. . The uterus has been resected. No adnexal masses appreciated.  No free fluid in the pelvis itself. Urinary bladder is fairly decompressed. No adenopathy in the pelvis or retroperitoneum     1. 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 Abnormal 2 indication may be from the right side or left side small bowel which extends to the level of the rectus sheath and there is extension beyond the rectus sheath into the subcutaneous tissues of the anterior abdominal wall and 2 different collections likely  communicate one in the right lower abdomen or upper pelvis and one near the midline inferiorly, overall extension is up to 10 7 m in length and areas of collections are described above 2. Previous abdominal surgery and anastomotic changes in the midline abdomen as well as within the rectum superiorly or rectosigmoid region. The anterior upper anastomotic change is near the fistula and cannot exclude a breakdown of the anastomosis or a enterocutaneous fistula to the right or left of it. 3. Fatty infiltration liver and mild hepatomegaly measuring up to 20 cm. 4. Prior cholecystectomy, IVC filter in place and prior hysterectomy 5. Low-attenuation adrenal lesions, stable over time       Final ED Course and MDM: In brief, Margarita Justice is a 64 y.o. female whose care was signed out to me by the outgoing provider. In brief, the patient awaited transfer to [facility] pending diversion status. 0300 Gave morphine for pain. [facility] on divert at 1873. [facility] still on divert at 7163. Patient signed back out to Dr. Robyn Abbasi at 0600. Change of shift.      ED Medication Orders (From admission, onward)    Start Ordered     Status Ordering Provider    01/28/20 0050 01/28/20 0050  morphine injection 4 mg  EVERY 3 HOURS PRN      Last MAR action:  Given - by Leonor Castañeda on 01/28/20 at [address]    98/83/26 5631

## 2020-01-28 NOTE — ED NOTES
Windy Soriano for update on bed assignment. Pt still on waiting list at this time and there are no beds available. Will update pt and family.       Aj Yanez RN  01/28/20 5973

## 2020-01-28 NOTE — ED NOTES
Patient provided with supper at this time per Dr. Bonita Ford.       Vinny Millan, RN  01/28/20 3127

## 2020-01-28 NOTE — ED NOTES
Patient up to restroom. Requested that dressing be changed. Removed old dressing which was saturated with brownish drainage. Replaced with 4x4s and ABD with paper tape. Patient also states she is chilling. Checked temp which is 100 degrees oral. Advised patient that I will monitor to see if it continues to go up.      Drea Cabral RN  01/28/20 4805

## 2020-01-29 PROCEDURE — 6360000002 HC RX W HCPCS: Performed by: EMERGENCY MEDICINE

## 2020-01-29 PROCEDURE — 2580000003 HC RX 258: Performed by: EMERGENCY MEDICINE

## 2020-01-29 PROCEDURE — 6370000000 HC RX 637 (ALT 250 FOR IP): Performed by: EMERGENCY MEDICINE

## 2020-01-29 PROCEDURE — 2500000003 HC RX 250 WO HCPCS: Performed by: EMERGENCY MEDICINE

## 2020-01-29 PROCEDURE — 6370000000 HC RX 637 (ALT 250 FOR IP)

## 2020-01-29 RX ORDER — ONDANSETRON 4 MG/1
TABLET, ORALLY DISINTEGRATING ORAL
Status: COMPLETED
Start: 2020-01-29 | End: 2020-01-29

## 2020-01-29 RX ORDER — ONDANSETRON 4 MG/1
4 TABLET, ORALLY DISINTEGRATING ORAL ONCE
Status: COMPLETED | OUTPATIENT
Start: 2020-01-29 | End: 2020-01-29

## 2020-01-29 RX ADMIN — CEFEPIME HYDROCHLORIDE 2 G: 2 INJECTION, POWDER, FOR SOLUTION INTRAVENOUS at 08:17

## 2020-01-29 RX ADMIN — METRONIDAZOLE 500 MG: 500 INJECTION, SOLUTION INTRAVENOUS at 23:17

## 2020-01-29 RX ADMIN — CEFEPIME HYDROCHLORIDE 2 G: 2 INJECTION, POWDER, FOR SOLUTION INTRAVENOUS at 21:03

## 2020-01-29 RX ADMIN — MORPHINE SULFATE 4 MG: 4 INJECTION INTRAVENOUS at 12:17

## 2020-01-29 RX ADMIN — MORPHINE SULFATE 4 MG: 4 INJECTION INTRAVENOUS at 23:17

## 2020-01-29 RX ADMIN — MORPHINE SULFATE 4 MG: 4 INJECTION INTRAVENOUS at 08:17

## 2020-01-29 RX ADMIN — MORPHINE SULFATE 4 MG: 4 INJECTION INTRAVENOUS at 04:12

## 2020-01-29 RX ADMIN — ATORVASTATIN CALCIUM 40 MG: 40 TABLET, FILM COATED ORAL at 21:03

## 2020-01-29 RX ADMIN — METRONIDAZOLE 500 MG: 500 INJECTION, SOLUTION INTRAVENOUS at 07:07

## 2020-01-29 RX ADMIN — ASPIRIN 81 MG 81 MG: 81 TABLET ORAL at 08:25

## 2020-01-29 RX ADMIN — ONDANSETRON 4 MG: 4 TABLET, ORALLY DISINTEGRATING ORAL at 08:25

## 2020-01-29 RX ADMIN — METRONIDAZOLE 500 MG: 500 INJECTION, SOLUTION INTRAVENOUS at 14:43

## 2020-01-29 RX ADMIN — MORPHINE SULFATE 4 MG: 4 INJECTION INTRAVENOUS at 18:38

## 2020-01-29 ASSESSMENT — PAIN SCALES - GENERAL
PAINLEVEL_OUTOF10: 6
PAINLEVEL_OUTOF10: 9
PAINLEVEL_OUTOF10: 8
PAINLEVEL_OUTOF10: 4
PAINLEVEL_OUTOF10: 7
PAINLEVEL_OUTOF10: 7
PAINLEVEL_OUTOF10: 8

## 2020-01-29 ASSESSMENT — PAIN DESCRIPTION - PAIN TYPE
TYPE: ACUTE PAIN
TYPE: ACUTE PAIN

## 2020-01-29 ASSESSMENT — PAIN DESCRIPTION - LOCATION
LOCATION: ABDOMEN
LOCATION: ABDOMEN

## 2020-01-29 NOTE — ED NOTES
[facility] for an update on a bed. [employer] that they called at 7:58 AM and [facility] reported that there is still no bed available.      [place] Nusrats  01/29/20 0830

## 2020-01-29 NOTE — ED NOTES
Will call dietary for lunch tray for patient. Pt has no other complaints except pain to the abdomen that is being controlled with Morphine. Pt states that the drainage has slowed down and that there is no odor to the drainage from the abdomen at this time. Will continue to monitor while awaiting a bed assignment.       Army Luz RN  01/29/20 7818

## 2020-01-29 NOTE — ED NOTES
Served patient lunch which was ordered by her Nurse Jason Bee. Patient was smiling and resting comfortably.      Lissy Hampton  01/29/20 8912

## 2020-01-29 NOTE — ED NOTES
Pt lying in bed resting at this time. Asked patient if she is ready for a shower. She states that she is waiting for her niece to come by and visit then she will let us know whenever she is ready to shower.       [address] [place] Lila Mcdonald RN  01/29/20 1600

## 2020-01-30 VITALS
SYSTOLIC BLOOD PRESSURE: 128 MMHG | OXYGEN SATURATION: 99 % | WEIGHT: 190 LBS | HEART RATE: 70 BPM | TEMPERATURE: 97.6 F | BODY MASS INDEX: 34.96 KG/M2 | RESPIRATION RATE: 18 BRPM | HEIGHT: 62 IN | DIASTOLIC BLOOD PRESSURE: 56 MMHG

## 2020-01-30 LAB
ANION GAP SERPL CALCULATED.3IONS-SCNC: 10 MMOL/L (ref 3–16)
BASOPHILS ABSOLUTE: 0.1 K/UL (ref 0–0.1)
BASOPHILS RELATIVE PERCENT: 0.9 %
BUN BLDV-MCNC: 10 MG/DL (ref 6–20)
CALCIUM SERPL-MCNC: 8.5 MG/DL (ref 8.5–10.5)
CHLORIDE BLD-SCNC: 107 MMOL/L (ref 98–107)
CO2: 22 MMOL/L (ref 20–30)
CREAT SERPL-MCNC: 0.7 MG/DL (ref 0.4–1.2)
EOSINOPHILS ABSOLUTE: 0.1 K/UL (ref 0–0.4)
EOSINOPHILS RELATIVE PERCENT: 1.7 %
GFR AFRICAN AMERICAN: >59
GFR NON-AFRICAN AMERICAN: >60
GLUCOSE BLD-MCNC: 110 MG/DL (ref 74–106)
HCT VFR BLD CALC: 28 % (ref 37–47)
HEMOGLOBIN: 9 G/DL (ref 11.5–16.5)
IMMATURE GRANULOCYTES #: 0.4 K/UL
IMMATURE GRANULOCYTES %: 5.5 % (ref 0–5)
LYMPHOCYTES ABSOLUTE: 2.3 K/UL (ref 1.5–4)
LYMPHOCYTES RELATIVE PERCENT: 30.1 %
MAGNESIUM: 1.7 MG/DL (ref 1.7–2.4)
MCH RBC QN AUTO: 29.3 PG (ref 27–32)
MCHC RBC AUTO-ENTMCNC: 32.1 G/DL (ref 31–35)
MCV RBC AUTO: 91.2 FL (ref 80–100)
MONOCYTES ABSOLUTE: 0.9 K/UL (ref 0.2–0.8)
MONOCYTES RELATIVE PERCENT: 11.8 %
NEUTROPHILS ABSOLUTE: 3.7 K/UL (ref 2–7.5)
NEUTROPHILS RELATIVE PERCENT: 50 %
PDW BLD-RTO: 16.3 % (ref 11–16)
PLATELET # BLD: 304 K/UL (ref 150–400)
PMV BLD AUTO: 9.1 FL (ref 6–10)
POTASSIUM REFLEX MAGNESIUM: 3.2 MMOL/L (ref 3.4–5.1)
RBC # BLD: 3.07 M/UL (ref 3.8–5.8)
SODIUM BLD-SCNC: 139 MMOL/L (ref 136–145)
WBC # BLD: 7.5 K/UL (ref 4–11)

## 2020-01-30 PROCEDURE — 6360000002 HC RX W HCPCS: Performed by: EMERGENCY MEDICINE

## 2020-01-30 PROCEDURE — 6370000000 HC RX 637 (ALT 250 FOR IP): Performed by: EMERGENCY MEDICINE

## 2020-01-30 PROCEDURE — 36415 COLL VENOUS BLD VENIPUNCTURE: CPT

## 2020-01-30 PROCEDURE — 85025 COMPLETE CBC W/AUTO DIFF WBC: CPT

## 2020-01-30 PROCEDURE — 2500000003 HC RX 250 WO HCPCS: Performed by: EMERGENCY MEDICINE

## 2020-01-30 PROCEDURE — 2580000003 HC RX 258: Performed by: EMERGENCY MEDICINE

## 2020-01-30 PROCEDURE — 83735 ASSAY OF MAGNESIUM: CPT

## 2020-01-30 PROCEDURE — 80048 BASIC METABOLIC PNL TOTAL CA: CPT

## 2020-01-30 PROCEDURE — 6370000000 HC RX 637 (ALT 250 FOR IP)

## 2020-01-30 RX ORDER — FLUCONAZOLE 150 MG/1
150 TABLET ORAL ONCE
Status: COMPLETED | OUTPATIENT
Start: 2020-01-30 | End: 2020-01-30

## 2020-01-30 RX ORDER — HYDROCODONE BITARTRATE AND ACETAMINOPHEN 7.5; 325 MG/1; MG/1
1 TABLET ORAL 3 TIMES DAILY
Status: DISCONTINUED | OUTPATIENT
Start: 2020-01-30 | End: 2020-01-30 | Stop reason: HOSPADM

## 2020-01-30 RX ORDER — FLUCONAZOLE 150 MG/1
TABLET ORAL
Status: COMPLETED
Start: 2020-01-30 | End: 2020-01-30

## 2020-01-30 RX ADMIN — ASPIRIN 81 MG 81 MG: 81 TABLET ORAL at 08:18

## 2020-01-30 RX ADMIN — CEFEPIME HYDROCHLORIDE 2 G: 2 INJECTION, POWDER, FOR SOLUTION INTRAVENOUS at 08:18

## 2020-01-30 RX ADMIN — MORPHINE SULFATE 4 MG: 4 INJECTION INTRAVENOUS at 08:28

## 2020-01-30 RX ADMIN — MORPHINE SULFATE 4 MG: 4 INJECTION INTRAVENOUS at 04:17

## 2020-01-30 RX ADMIN — FLUCONAZOLE 150 MG: 150 TABLET ORAL at 00:35

## 2020-01-30 RX ADMIN — METRONIDAZOLE 500 MG: 500 INJECTION, SOLUTION INTRAVENOUS at 06:14

## 2020-01-30 RX ADMIN — HYDROCODONE BITARTRATE AND ACETAMINOPHEN 1 TABLET: 7.5; 325 TABLET ORAL at 12:45

## 2020-01-30 RX ADMIN — METRONIDAZOLE 500 MG: 500 INJECTION, SOLUTION INTRAVENOUS at 14:55

## 2020-01-30 ASSESSMENT — PAIN SCALES - GENERAL
PAINLEVEL_OUTOF10: 8
PAINLEVEL_OUTOF10: 7
PAINLEVEL_OUTOF10: 5

## 2020-01-30 ASSESSMENT — PAIN DESCRIPTION - DESCRIPTORS: DESCRIPTORS: DULL;ACHING

## 2020-01-30 ASSESSMENT — PAIN DESCRIPTION - FREQUENCY: FREQUENCY: CONTINUOUS

## 2020-01-30 ASSESSMENT — PAIN DESCRIPTION - PROGRESSION: CLINICAL_PROGRESSION: GRADUALLY IMPROVING

## 2020-01-30 ASSESSMENT — PAIN DESCRIPTION - LOCATION: LOCATION: ABDOMEN

## 2020-01-30 ASSESSMENT — PAIN DESCRIPTION - ORIENTATION: ORIENTATION: RIGHT;UPPER;MID

## 2020-01-30 NOTE — ED NOTES
Mami Queen at [facility] called for update on patient. She states that they are waiting for discharges, patient will have a bed later today and they will call us back as soon as bed becomes available.      Laurina Kussmaul, RN  01/30/20 2440

## 2020-01-30 NOTE — ED NOTES
ED disposition vitals at 1131 was documented on the wrong patient.      Hali Rivas RN  01/30/20 1154

## 2020-01-30 NOTE — ED NOTES
Patient not in room at this time. Patient has been noted to walk hallways. Will check room again & obtain vitals at a later time.      Rico Mcdowell RN  01/30/20 0522

## 2020-02-01 LAB
BLOOD CULTURE, ROUTINE: NORMAL
CULTURE, BLOOD 2: NORMAL
GRAM STAIN RESULT: ABNORMAL
ORGANISM: ABNORMAL
ORGANISM: ABNORMAL
WOUND/ABSCESS: ABNORMAL
WOUND/ABSCESS: ABNORMAL

## 2020-03-03 ENCOUNTER — HOSPITAL ENCOUNTER (OUTPATIENT)
Facility: HOSPITAL | Age: 60
Discharge: HOME | End: 2020-03-03
Payer: MEDICAID

## 2020-03-03 LAB
A/G RATIO: 1.1 (ref 0.8–2)
ALBUMIN SERPL-MCNC: 3 G/DL (ref 3.4–4.8)
ALP BLD-CCNC: 83 U/L (ref 25–100)
ALT SERPL-CCNC: 9 U/L (ref 4–36)
ANION GAP SERPL CALCULATED.3IONS-SCNC: 14 MMOL/L (ref 3–16)
AST SERPL-CCNC: 14 U/L (ref 8–33)
BASOPHILS ABSOLUTE: 0.1 K/UL (ref 0–0.1)
BASOPHILS RELATIVE PERCENT: 0.6 %
BILIRUB SERPL-MCNC: <0.2 MG/DL (ref 0.3–1.2)
BUN BLDV-MCNC: 9 MG/DL (ref 6–20)
CALCIUM SERPL-MCNC: 8.7 MG/DL (ref 8.5–10.5)
CHLORIDE BLD-SCNC: 103 MMOL/L (ref 98–107)
CHOLESTEROL, TOTAL: 75 MG/DL (ref 0–200)
CO2: 25 MMOL/L (ref 20–30)
CREAT SERPL-MCNC: 0.8 MG/DL (ref 0.4–1.2)
EOSINOPHILS ABSOLUTE: 0.1 K/UL (ref 0–0.4)
EOSINOPHILS RELATIVE PERCENT: 0.7 %
FOLATE: 18.95 NG/ML
GFR AFRICAN AMERICAN: >59
GFR NON-AFRICAN AMERICAN: >60
GLOBULIN: 2.7 G/DL
GLUCOSE BLD-MCNC: 139 MG/DL (ref 74–106)
HCT VFR BLD CALC: 37.6 % (ref 37–47)
HDLC SERPL-MCNC: 42 MG/DL (ref 40–60)
HEMOGLOBIN: 11.3 G/DL (ref 11.5–16.5)
IMMATURE GRANULOCYTES #: 0.1 K/UL
IMMATURE GRANULOCYTES %: 0.5 % (ref 0–5)
LDL CHOLESTEROL CALCULATED: 10 MG/DL
LYMPHOCYTES ABSOLUTE: 2 K/UL (ref 1.5–4)
LYMPHOCYTES RELATIVE PERCENT: 18.3 %
MCH RBC QN AUTO: 29.1 PG (ref 27–32)
MCHC RBC AUTO-ENTMCNC: 30.1 G/DL (ref 31–35)
MCV RBC AUTO: 96.9 FL (ref 80–100)
MONOCYTES ABSOLUTE: 0.5 K/UL (ref 0.2–0.8)
MONOCYTES RELATIVE PERCENT: 4.5 %
NEUTROPHILS ABSOLUTE: 8.2 K/UL (ref 2–7.5)
NEUTROPHILS RELATIVE PERCENT: 75.4 %
PDW BLD-RTO: 17.8 % (ref 11–16)
PLATELET # BLD: 461 K/UL (ref 150–400)
PMV BLD AUTO: 9.7 FL (ref 6–10)
POTASSIUM SERPL-SCNC: 3.5 MMOL/L (ref 3.4–5.1)
RBC # BLD: 3.88 M/UL (ref 3.8–5.8)
SODIUM BLD-SCNC: 142 MMOL/L (ref 136–145)
TOTAL PROTEIN: 5.7 G/DL (ref 6.4–8.3)
TRIGL SERPL-MCNC: 115 MG/DL (ref 0–249)
TSH SERPL DL<=0.05 MIU/L-ACNC: 0.35 UIU/ML (ref 0.35–5.5)
VITAMIN B-12: 478 PG/ML (ref 211–911)
VITAMIN D 25-HYDROXY: 28.8 (ref 32–100)
VLDLC SERPL CALC-MCNC: 23 MG/DL
WBC # BLD: 10.9 K/UL (ref 4–11)

## 2020-03-03 PROCEDURE — 36415 COLL VENOUS BLD VENIPUNCTURE: CPT

## 2020-03-03 PROCEDURE — 80053 COMPREHEN METABOLIC PANEL: CPT

## 2020-03-03 PROCEDURE — 82746 ASSAY OF FOLIC ACID SERUM: CPT

## 2020-03-03 PROCEDURE — 85025 COMPLETE CBC W/AUTO DIFF WBC: CPT

## 2020-03-03 PROCEDURE — 80061 LIPID PANEL: CPT

## 2020-03-03 PROCEDURE — 82607 VITAMIN B-12: CPT

## 2020-03-03 PROCEDURE — 82306 VITAMIN D 25 HYDROXY: CPT

## 2020-03-03 PROCEDURE — 84443 ASSAY THYROID STIM HORMONE: CPT

## 2020-06-22 ENCOUNTER — HOSPITAL ENCOUNTER (OUTPATIENT)
Facility: HOSPITAL | Age: 60
Discharge: HOME | End: 2020-06-22
Payer: MEDICARE

## 2020-06-22 LAB
A/G RATIO: 1.1 (ref 0.8–2)
ALBUMIN SERPL-MCNC: 3.1 G/DL (ref 3.4–4.8)
ALP BLD-CCNC: 122 U/L (ref 25–100)
ALT SERPL-CCNC: 17 U/L (ref 4–36)
ANION GAP SERPL CALCULATED.3IONS-SCNC: 15 MMOL/L (ref 3–16)
AST SERPL-CCNC: 13 U/L (ref 8–33)
BASOPHILS ABSOLUTE: 0.1 K/UL (ref 0–0.1)
BASOPHILS RELATIVE PERCENT: 0.5 %
BILIRUB SERPL-MCNC: <0.2 MG/DL (ref 0.3–1.2)
BUN BLDV-MCNC: 12 MG/DL (ref 6–20)
CALCIUM SERPL-MCNC: 8.9 MG/DL (ref 8.5–10.5)
CHLORIDE BLD-SCNC: 106 MMOL/L (ref 98–107)
CHOLESTEROL, TOTAL: 89 MG/DL (ref 0–200)
CO2: 21 MMOL/L (ref 20–30)
CREAT SERPL-MCNC: 0.8 MG/DL (ref 0.4–1.2)
EOSINOPHILS ABSOLUTE: 0.2 K/UL (ref 0–0.4)
EOSINOPHILS RELATIVE PERCENT: 2.3 %
FOLATE: >20 NG/ML
GFR AFRICAN AMERICAN: >59
GFR NON-AFRICAN AMERICAN: >60
GLOBULIN: 2.9 G/DL
GLUCOSE BLD-MCNC: 162 MG/DL (ref 74–106)
HBA1C MFR BLD: 5 %
HCT VFR BLD CALC: 35.5 % (ref 37–47)
HDLC SERPL-MCNC: 35 MG/DL (ref 40–60)
HEMOGLOBIN: 11.4 G/DL (ref 11.5–16.5)
IMMATURE GRANULOCYTES #: 0.1 K/UL
IMMATURE GRANULOCYTES %: 0.7 % (ref 0–5)
LDL CHOLESTEROL CALCULATED: 20 MG/DL
LYMPHOCYTES ABSOLUTE: 3 K/UL (ref 1.5–4)
LYMPHOCYTES RELATIVE PERCENT: 28.5 %
MCH RBC QN AUTO: 29 PG (ref 27–32)
MCHC RBC AUTO-ENTMCNC: 32.1 G/DL (ref 31–35)
MCV RBC AUTO: 90.3 FL (ref 80–100)
MONOCYTES ABSOLUTE: 0.6 K/UL (ref 0.2–0.8)
MONOCYTES RELATIVE PERCENT: 5.9 %
NEUTROPHILS ABSOLUTE: 6.4 K/UL (ref 2–7.5)
NEUTROPHILS RELATIVE PERCENT: 62.1 %
PDW BLD-RTO: 15.7 % (ref 11–16)
PLATELET # BLD: 494 K/UL (ref 150–400)
PMV BLD AUTO: 9.1 FL (ref 6–10)
POTASSIUM SERPL-SCNC: 3.8 MMOL/L (ref 3.4–5.1)
RBC # BLD: 3.93 M/UL (ref 3.8–5.8)
SODIUM BLD-SCNC: 142 MMOL/L (ref 136–145)
TOTAL PROTEIN: 6 G/DL (ref 6.4–8.3)
TRIGL SERPL-MCNC: 171 MG/DL (ref 0–249)
TSH SERPL DL<=0.05 MIU/L-ACNC: 0.31 UIU/ML (ref 0.35–5.5)
VITAMIN B-12: 467 PG/ML (ref 211–911)
VITAMIN D 25-HYDROXY: 34.2 (ref 32–100)
VLDLC SERPL CALC-MCNC: 34 MG/DL
WBC # BLD: 10.4 K/UL (ref 4–11)

## 2020-06-22 PROCEDURE — 87077 CULTURE AEROBIC IDENTIFY: CPT

## 2020-06-22 PROCEDURE — 82306 VITAMIN D 25 HYDROXY: CPT

## 2020-06-22 PROCEDURE — 87186 SC STD MICRODIL/AGAR DIL: CPT

## 2020-06-22 PROCEDURE — 85025 COMPLETE CBC W/AUTO DIFF WBC: CPT

## 2020-06-22 PROCEDURE — 82746 ASSAY OF FOLIC ACID SERUM: CPT

## 2020-06-22 PROCEDURE — 83036 HEMOGLOBIN GLYCOSYLATED A1C: CPT

## 2020-06-22 PROCEDURE — 87070 CULTURE OTHR SPECIMN AEROBIC: CPT

## 2020-06-22 PROCEDURE — 84443 ASSAY THYROID STIM HORMONE: CPT

## 2020-06-22 PROCEDURE — 82607 VITAMIN B-12: CPT

## 2020-06-22 PROCEDURE — 87205 SMEAR GRAM STAIN: CPT

## 2020-06-22 PROCEDURE — 80061 LIPID PANEL: CPT

## 2020-06-22 PROCEDURE — 80053 COMPREHEN METABOLIC PANEL: CPT

## 2020-06-24 LAB
GRAM STAIN RESULT: ABNORMAL
ORGANISM: ABNORMAL
WOUND/ABSCESS: ABNORMAL

## 2020-10-02 ENCOUNTER — HOSPITAL ENCOUNTER (OUTPATIENT)
Facility: HOSPITAL | Age: 60
Discharge: HOME | End: 2020-10-02
Payer: MEDICARE

## 2020-10-02 LAB
A/G RATIO: 1.1 (ref 0.8–2)
ALBUMIN SERPL-MCNC: 2.9 G/DL (ref 3.4–4.8)
ALP BLD-CCNC: 128 U/L (ref 25–100)
ALT SERPL-CCNC: 10 U/L (ref 4–36)
ANION GAP SERPL CALCULATED.3IONS-SCNC: 13 MMOL/L (ref 3–16)
AST SERPL-CCNC: 11 U/L (ref 8–33)
BASOPHILS ABSOLUTE: 0.1 K/UL (ref 0–0.1)
BASOPHILS RELATIVE PERCENT: 0.4 %
BILIRUB SERPL-MCNC: <0.2 MG/DL (ref 0.3–1.2)
BUN BLDV-MCNC: 16 MG/DL (ref 6–20)
CALCIUM SERPL-MCNC: 8.5 MG/DL (ref 8.5–10.5)
CHLORIDE BLD-SCNC: 105 MMOL/L (ref 98–107)
CHOLESTEROL, TOTAL: 92 MG/DL (ref 0–200)
CO2: 22 MMOL/L (ref 20–30)
CREAT SERPL-MCNC: 0.9 MG/DL (ref 0.4–1.2)
EOSINOPHILS ABSOLUTE: 0.2 K/UL (ref 0–0.4)
EOSINOPHILS RELATIVE PERCENT: 1.5 %
FOLATE: 17.53 NG/ML
GFR AFRICAN AMERICAN: >59
GFR NON-AFRICAN AMERICAN: >60
GLOBULIN: 2.7 G/DL
GLUCOSE BLD-MCNC: 89 MG/DL (ref 74–106)
HBA1C MFR BLD: 5.1 %
HCT VFR BLD CALC: 36.8 % (ref 37–47)
HDLC SERPL-MCNC: 43 MG/DL (ref 40–60)
HEMOGLOBIN: 11 G/DL (ref 11.5–16.5)
IMMATURE GRANULOCYTES #: 0.1 K/UL
IMMATURE GRANULOCYTES %: 0.5 % (ref 0–5)
LDL CHOLESTEROL CALCULATED: 22 MG/DL
LYMPHOCYTES ABSOLUTE: 3 K/UL (ref 1.5–4)
LYMPHOCYTES RELATIVE PERCENT: 22.5 %
MCH RBC QN AUTO: 29.3 PG (ref 27–32)
MCHC RBC AUTO-ENTMCNC: 29.9 G/DL (ref 31–35)
MCV RBC AUTO: 97.9 FL (ref 80–100)
MONOCYTES ABSOLUTE: 0.9 K/UL (ref 0.2–0.8)
MONOCYTES RELATIVE PERCENT: 6.3 %
NEUTROPHILS ABSOLUTE: 9.3 K/UL (ref 2–7.5)
NEUTROPHILS RELATIVE PERCENT: 68.8 %
PDW BLD-RTO: 15.7 % (ref 11–16)
PLATELET # BLD: 448 K/UL (ref 150–400)
PMV BLD AUTO: 9.2 FL (ref 6–10)
POTASSIUM SERPL-SCNC: 3.7 MMOL/L (ref 3.4–5.1)
RBC # BLD: 3.76 M/UL (ref 3.8–5.8)
SODIUM BLD-SCNC: 140 MMOL/L (ref 136–145)
TOTAL PROTEIN: 5.6 G/DL (ref 6.4–8.3)
TRIGL SERPL-MCNC: 137 MG/DL (ref 0–249)
TSH SERPL DL<=0.05 MIU/L-ACNC: 0.15 UIU/ML (ref 0.35–5.5)
VITAMIN B-12: 648 PG/ML (ref 211–911)
VITAMIN D 25-HYDROXY: 25.7 (ref 32–100)
VLDLC SERPL CALC-MCNC: 27 MG/DL
WBC # BLD: 13.5 K/UL (ref 4–11)

## 2020-10-02 PROCEDURE — 83036 HEMOGLOBIN GLYCOSYLATED A1C: CPT

## 2020-10-02 PROCEDURE — 82607 VITAMIN B-12: CPT

## 2020-10-02 PROCEDURE — 80061 LIPID PANEL: CPT

## 2020-10-02 PROCEDURE — 82746 ASSAY OF FOLIC ACID SERUM: CPT

## 2020-10-02 PROCEDURE — 82306 VITAMIN D 25 HYDROXY: CPT

## 2020-10-02 PROCEDURE — 85025 COMPLETE CBC W/AUTO DIFF WBC: CPT

## 2020-10-02 PROCEDURE — 84443 ASSAY THYROID STIM HORMONE: CPT

## 2020-10-02 PROCEDURE — 36415 COLL VENOUS BLD VENIPUNCTURE: CPT

## 2020-10-02 PROCEDURE — 80053 COMPREHEN METABOLIC PANEL: CPT

## 2021-06-24 NOTE — ED NOTES
Pt resting in bed at this time. PRN Morphine given for abdominal pain, IV Flagyl infusing. Bed linens changed. Pt denies any other needs at this time.       Vinny Millan RN  01/29/20 0591 Product 11 Application Directions: Apply pea size amount to face qhs \\n\\nLot# [id number] Product 11 Application Directions: Apply pea size amount to face qhs \\n\\nLot# [id number]

## 2022-01-03 ENCOUNTER — HOSPITAL ENCOUNTER (INPATIENT)
Facility: HOSPITAL | Age: 62
LOS: 1 days | Discharge: HOME | DRG: 684 | End: 2022-01-04
Attending: EMERGENCY MEDICINE | Admitting: INTERNAL MEDICINE
Payer: MEDICARE

## 2022-01-03 DIAGNOSIS — L98.8 FISTULA: ICD-10-CM

## 2022-01-03 DIAGNOSIS — E86.0 DEHYDRATION: Primary | ICD-10-CM

## 2022-01-03 DIAGNOSIS — N17.9 ACUTE RENAL FAILURE, UNSPECIFIED ACUTE RENAL FAILURE TYPE (HCC): ICD-10-CM

## 2022-01-03 LAB
A/G RATIO: 1 (ref 0.8–2)
ALBUMIN SERPL-MCNC: 3.8 G/DL (ref 3.4–4.8)
ALP BLD-CCNC: 140 U/L (ref 25–100)
ALT SERPL-CCNC: 45 U/L (ref 4–36)
ANION GAP SERPL CALCULATED.3IONS-SCNC: 18 MMOL/L (ref 3–16)
AST SERPL-CCNC: 49 U/L (ref 8–33)
BACTERIA: ABNORMAL /HPF
BASOPHILS ABSOLUTE: 0.1 K/UL (ref 0–0.1)
BASOPHILS RELATIVE PERCENT: 0.5 %
BILIRUB SERPL-MCNC: 0.4 MG/DL (ref 0.3–1.2)
BILIRUBIN URINE: NEGATIVE
BLOOD, URINE: NEGATIVE
BUN BLDV-MCNC: 69 MG/DL (ref 6–20)
CALCIUM SERPL-MCNC: 9.5 MG/DL (ref 8.5–10.5)
CHLORIDE BLD-SCNC: 95 MMOL/L (ref 98–107)
CLARITY: CLEAR
CO2: 20 MMOL/L (ref 20–30)
COLOR: YELLOW
CREAT SERPL-MCNC: 2.3 MG/DL (ref 0.4–1.2)
EOSINOPHILS ABSOLUTE: 0 K/UL (ref 0–0.4)
EOSINOPHILS RELATIVE PERCENT: 0.3 %
EPITHELIAL CELLS, UA: ABNORMAL /HPF (ref 0–5)
GFR AFRICAN AMERICAN: 26
GFR NON-AFRICAN AMERICAN: 22
GLOBULIN: 3.8 G/DL
GLUCOSE BLD-MCNC: 111 MG/DL (ref 74–106)
GLUCOSE URINE: NEGATIVE MG/DL
HCT VFR BLD CALC: 40.3 % (ref 37–47)
HEMOGLOBIN: 13.2 G/DL (ref 11.5–16.5)
IMMATURE GRANULOCYTES #: 0.1 K/UL
IMMATURE GRANULOCYTES %: 0.6 % (ref 0–5)
KETONES, URINE: ABNORMAL MG/DL
LEUKOCYTE ESTERASE, URINE: ABNORMAL
LIPASE: 48 U/L (ref 5.6–51.3)
LYMPHOCYTES ABSOLUTE: 2.3 K/UL (ref 1.5–4)
LYMPHOCYTES RELATIVE PERCENT: 19.3 %
MCH RBC QN AUTO: 30 PG (ref 27–32)
MCHC RBC AUTO-ENTMCNC: 32.8 G/DL (ref 31–35)
MCV RBC AUTO: 91.6 FL (ref 80–100)
MICROSCOPIC EXAMINATION: YES
MONOCYTES ABSOLUTE: 0.7 K/UL (ref 0.2–0.8)
MONOCYTES RELATIVE PERCENT: 5.8 %
NEUTROPHILS ABSOLUTE: 8.7 K/UL (ref 2–7.5)
NEUTROPHILS RELATIVE PERCENT: 73.5 %
NITRITE, URINE: NEGATIVE
PDW BLD-RTO: 14.4 % (ref 11–16)
PH UA: 7 (ref 5–8)
PLATELET # BLD: 290 K/UL (ref 150–400)
PMV BLD AUTO: 9.3 FL (ref 6–10)
POTASSIUM REFLEX MAGNESIUM: 4 MMOL/L (ref 3.4–5.1)
PROTEIN UA: ABNORMAL MG/DL
RBC # BLD: 4.4 M/UL (ref 3.8–5.8)
RBC UA: ABNORMAL /HPF (ref 0–4)
SARS-COV-2, NAAT: NOT DETECTED
SODIUM BLD-SCNC: 133 MMOL/L (ref 136–145)
SPECIFIC GRAVITY UA: 1.02 (ref 1–1.03)
TOTAL PROTEIN: 7.6 G/DL (ref 6.4–8.3)
URINE REFLEX TO CULTURE: ABNORMAL
URINE TYPE: ABNORMAL
UROBILINOGEN, URINE: 0.2 E.U./DL
WBC # BLD: 11.8 K/UL (ref 4–11)
WBC UA: ABNORMAL /HPF (ref 0–5)

## 2022-01-03 PROCEDURE — 2580000003 HC RX 258: Performed by: EMERGENCY MEDICINE

## 2022-01-03 PROCEDURE — G0378 HOSPITAL OBSERVATION PER HR: HCPCS

## 2022-01-03 PROCEDURE — 6370000000 HC RX 637 (ALT 250 FOR IP): Performed by: NURSE PRACTITIONER

## 2022-01-03 PROCEDURE — 83690 ASSAY OF LIPASE: CPT

## 2022-01-03 PROCEDURE — 85025 COMPLETE CBC W/AUTO DIFF WBC: CPT

## 2022-01-03 PROCEDURE — 80053 COMPREHEN METABOLIC PANEL: CPT

## 2022-01-03 PROCEDURE — 1200000000 HC SEMI PRIVATE

## 2022-01-03 PROCEDURE — 96360 HYDRATION IV INFUSION INIT: CPT

## 2022-01-03 PROCEDURE — 87635 SARS-COV-2 COVID-19 AMP PRB: CPT

## 2022-01-03 PROCEDURE — 36415 COLL VENOUS BLD VENIPUNCTURE: CPT

## 2022-01-03 PROCEDURE — 81001 URINALYSIS AUTO W/SCOPE: CPT

## 2022-01-03 PROCEDURE — 99283 EMERGENCY DEPT VISIT LOW MDM: CPT

## 2022-01-03 PROCEDURE — 2580000003 HC RX 258: Performed by: NURSE PRACTITIONER

## 2022-01-03 PROCEDURE — 96361 HYDRATE IV INFUSION ADD-ON: CPT

## 2022-01-03 RX ORDER — VITAMIN B COMPLEX
2000 TABLET ORAL DAILY
Status: DISCONTINUED | OUTPATIENT
Start: 2022-01-04 | End: 2022-01-04 | Stop reason: HOSPADM

## 2022-01-03 RX ORDER — SODIUM CHLORIDE 0.9 % (FLUSH) 0.9 %
5-40 SYRINGE (ML) INJECTION EVERY 12 HOURS SCHEDULED
Status: DISCONTINUED | OUTPATIENT
Start: 2022-01-03 | End: 2022-01-04 | Stop reason: HOSPADM

## 2022-01-03 RX ORDER — 0.9 % SODIUM CHLORIDE 0.9 %
1000 INTRAVENOUS SOLUTION INTRAVENOUS ONCE
Status: COMPLETED | OUTPATIENT
Start: 2022-01-03 | End: 2022-01-03

## 2022-01-03 RX ORDER — SODIUM CHLORIDE, SODIUM LACTATE, POTASSIUM CHLORIDE, AND CALCIUM CHLORIDE .6; .31; .03; .02 G/100ML; G/100ML; G/100ML; G/100ML
1000 INJECTION, SOLUTION INTRAVENOUS ONCE
Status: COMPLETED | OUTPATIENT
Start: 2022-01-03 | End: 2022-01-03

## 2022-01-03 RX ORDER — ATORVASTATIN CALCIUM 40 MG/1
40 TABLET, FILM COATED ORAL DAILY
Status: DISCONTINUED | OUTPATIENT
Start: 2022-01-04 | End: 2022-01-04 | Stop reason: HOSPADM

## 2022-01-03 RX ORDER — ALLOPURINOL 100 MG/1
100 TABLET ORAL DAILY
Status: DISCONTINUED | OUTPATIENT
Start: 2022-01-04 | End: 2022-01-04 | Stop reason: HOSPADM

## 2022-01-03 RX ORDER — SODIUM CHLORIDE 0.9 % (FLUSH) 0.9 %
10 SYRINGE (ML) INJECTION PRN
Status: DISCONTINUED | OUTPATIENT
Start: 2022-01-03 | End: 2022-01-04 | Stop reason: HOSPADM

## 2022-01-03 RX ORDER — FAMOTIDINE 20 MG/1
10 TABLET, FILM COATED ORAL DAILY
Status: DISCONTINUED | OUTPATIENT
Start: 2022-01-04 | End: 2022-01-04 | Stop reason: HOSPADM

## 2022-01-03 RX ORDER — SODIUM CHLORIDE 9 MG/ML
INJECTION, SOLUTION INTRAVENOUS CONTINUOUS
Status: DISCONTINUED | OUTPATIENT
Start: 2022-01-03 | End: 2022-01-04 | Stop reason: HOSPADM

## 2022-01-03 RX ORDER — ACETAMINOPHEN 325 MG/1
650 TABLET ORAL EVERY 6 HOURS PRN
Status: DISCONTINUED | OUTPATIENT
Start: 2022-01-03 | End: 2022-01-04 | Stop reason: HOSPADM

## 2022-01-03 RX ORDER — ONDANSETRON 4 MG/1
4 TABLET, ORALLY DISINTEGRATING ORAL EVERY 8 HOURS PRN
Status: DISCONTINUED | OUTPATIENT
Start: 2022-01-03 | End: 2022-01-04 | Stop reason: HOSPADM

## 2022-01-03 RX ORDER — ONDANSETRON 4 MG/1
4 TABLET, FILM COATED ORAL
COMMUNITY

## 2022-01-03 RX ORDER — HYDROCODONE BITARTRATE AND ACETAMINOPHEN 7.5; 325 MG/1; MG/1
1 TABLET ORAL EVERY 8 HOURS PRN
Status: DISCONTINUED | OUTPATIENT
Start: 2022-01-03 | End: 2022-01-04 | Stop reason: HOSPADM

## 2022-01-03 RX ORDER — FAMOTIDINE 20 MG/1
20 TABLET, FILM COATED ORAL DAILY
Status: DISCONTINUED | OUTPATIENT
Start: 2022-01-04 | End: 2022-01-03 | Stop reason: DRUGHIGH

## 2022-01-03 RX ORDER — ASPIRIN 81 MG/1
81 TABLET ORAL DAILY
Status: DISCONTINUED | OUTPATIENT
Start: 2022-01-04 | End: 2022-01-04 | Stop reason: HOSPADM

## 2022-01-03 RX ORDER — HYDROCODONE BITARTRATE AND ACETAMINOPHEN 10; 325 MG/1; MG/1
1 TABLET ORAL 3 TIMES DAILY PRN
COMMUNITY

## 2022-01-03 RX ORDER — ONDANSETRON 2 MG/ML
4 INJECTION INTRAMUSCULAR; INTRAVENOUS EVERY 6 HOURS PRN
Status: DISCONTINUED | OUTPATIENT
Start: 2022-01-03 | End: 2022-01-04 | Stop reason: HOSPADM

## 2022-01-03 RX ORDER — SODIUM CHLORIDE 9 MG/ML
25 INJECTION, SOLUTION INTRAVENOUS PRN
Status: DISCONTINUED | OUTPATIENT
Start: 2022-01-03 | End: 2022-01-04 | Stop reason: HOSPADM

## 2022-01-03 RX ORDER — ACETAMINOPHEN 650 MG/1
650 SUPPOSITORY RECTAL EVERY 6 HOURS PRN
Status: DISCONTINUED | OUTPATIENT
Start: 2022-01-03 | End: 2022-01-04 | Stop reason: HOSPADM

## 2022-01-03 RX ADMIN — SODIUM CHLORIDE 1000 ML: 9 INJECTION, SOLUTION INTRAVENOUS at 18:31

## 2022-01-03 RX ADMIN — SODIUM CHLORIDE, POTASSIUM CHLORIDE, SODIUM LACTATE AND CALCIUM CHLORIDE 1000 ML: 600; 310; 30; 20 INJECTION, SOLUTION INTRAVENOUS at 15:57

## 2022-01-03 RX ADMIN — SODIUM CHLORIDE: 9 INJECTION, SOLUTION INTRAVENOUS at 23:14

## 2022-01-03 RX ADMIN — HYDROCODONE BITARTRATE AND ACETAMINOPHEN 1 TABLET: 7.5; 325 TABLET ORAL at 23:03

## 2022-01-03 RX ADMIN — SODIUM CHLORIDE 1000 ML: 9 INJECTION, SOLUTION INTRAVENOUS at 17:30

## 2022-01-03 ASSESSMENT — PAIN DESCRIPTION - PAIN TYPE: TYPE: CHRONIC PAIN

## 2022-01-03 ASSESSMENT — PAIN DESCRIPTION - PROGRESSION: CLINICAL_PROGRESSION: NOT CHANGED

## 2022-01-03 ASSESSMENT — PAIN SCALES - GENERAL
PAINLEVEL_OUTOF10: 5
PAINLEVEL_OUTOF10: 10

## 2022-01-03 ASSESSMENT — PAIN DESCRIPTION - FREQUENCY: FREQUENCY: CONTINUOUS

## 2022-01-03 ASSESSMENT — PAIN DESCRIPTION - LOCATION: LOCATION: ABDOMEN

## 2022-01-03 ASSESSMENT — PAIN DESCRIPTION - ONSET: ONSET: ON-GOING

## 2022-01-03 ASSESSMENT — PAIN DESCRIPTION - DESCRIPTORS: DESCRIPTORS: ACHING;DULL

## 2022-01-03 NOTE — ED NOTES
Orthostatic BP reported to MD; Laying 108/72, sitting 112/72, standing 65/53, 55/35; pt placed back in bed. Pt unable to void at this time.      Tiera Lee RN  01/03/22 7485

## 2022-01-03 NOTE — ED NOTES
Patient states she does not want a cath done to collect urine, \" I will try and pee in a little while. Patient updated on admission, understanding verbalized. Dr Radha Tobias notified that patient has refused straight cath.      Yony Haley RN  01/03/22 8747

## 2022-01-03 NOTE — LETTER
Pt is a current patient. DCd to home yesterday. Let me know if you need anything. #:        Erminio Kussmaul, RN  Care Coordinator  [facility]  [address], [place]  Office:  [phone]  Fax:  [phone]  [email or web address]

## 2022-01-03 NOTE — ED NOTES
Patient up to bedside commode. Patient's fistula dressing came off and leaked a small to moderate watery stool. abd cleaned with normal saline by patient and 4x4 guaze dressing applied with a trauma dressing on top.      Irma Lin RN  01/03/22 Diony Milton, RN  01/03/22 7358

## 2022-01-03 NOTE — LETTER
Pt will be going home today. Lives in [facility]. Current patient. #:        Let me know if you need anything,   Aminta Fleming, RN  Care Coordinator  [facility]  [address], [place] [address] [address]  Office:  [phone]  Fax:  [phone]  [email or web address]

## 2022-01-03 NOTE — ED NOTES
Ashley Lord from [facility] called with report- sending patient to ER for eval- states patient presented with c/o dizzy, pale, lethargic and just feels bad- reports patient states she just got out of [facility] hospital in the past week for anemia, blood transfusion, and abd infection- reports pt has history of abd infection for past 3 years and had open abd in the past- reports 142/62 manual BP and irregular heart rate      Cody Fay, RN  01/03/22 9968

## 2022-01-03 NOTE — ED PROVIDER NOTES
Inda Brittle [address] ENCOUNTER      Pt Name: Alireza Kennedy  MRN: [id number]  YOB: 1960  Date of evaluation: 1/3/2022  Provider: Saleem Thompson MD    CHIEF COMPLAINT       Chief Complaint   Patient presents with    Abnormal Lab     Home Health called patient with abnormal labs and sent to pcp. Potassium, WBC, platelets elevated and kidney function. PCP sent patient to ED for evaluation.  Dizziness     weakness, decreased po intake, ongoing for several months and  abd fistula's draining for 3/4 years         HISTORY OF PRESENT ILLNESS  (Location/Symptom, Timing/Onset, Context/Setting, Quality, Duration, Modifying Factors, Severity.)   Alireza Kennedy is a 63 y.o. 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      Nursing notes were reviewed.     REVIEW OFSYSTEMS    (2-9 systems for level 4, 10 or more for level 5)   ROS:  General:  No fevers, no chills, no weakness  Cardiovascular:  No chest pain, no palpitations  Respiratory:  No shortness of breath, no cough, no wheezing  Gastrointestinal:  + pain, no nausea, no vomiting, no diarrhea  Musculoskeletal:  No muscle pain, no joint pain  Skin:  No rash, no easy bruising  Neurologic:  No speech problems, no headache, no extremity weakness  Psychiatric:  No anxiety  Genitourinary:  No dysuria, no hematuria    Except as noted above the remainder of the review of systems was reviewed and negative. PAST MEDICAL HISTORY     Past Medical History:   Diagnosis Date    Hyperlipidemia     Thyroid disease          SURGICAL HISTORY       Past Surgical History:   Procedure Laterality Date    ABDOMEN SURGERY      CHOLECYSTECTOMY      HERNIA REPAIR      HYSTERECTOMY           CURRENT MEDICATIONS       Previous Medications    ALLOPURINOL (ZYLOPRIM) 100 MG TABLET    Take 100 mg by mouth daily    ASPIRIN (EQ ASPIRIN ADULT LOW DOSE) 81 MG EC TABLET    Take 81 mg by mouth daily    ATORVASTATIN (LIPITOR) 40 MG TABLET    Take 40 mg by mouth daily    HYDROCODONE-ACETAMINOPHEN (NORCO) 7.5-325 MG PER TABLET    Take by mouth 3 times daily as needed. ALLERGIES     Penicillins and Sulfa antibiotics    FAMILY HISTORY     History reviewed. No pertinent family history. SOCIAL HISTORY       Social History     Socioeconomic History    Marital status:      Spouse name: None    Number of children: None    Years of education: None    Highest education level: None   Occupational History    None   Tobacco Use    Smoking status: Current Every Day Smoker     Packs/day: 0.50     Types: Cigarettes    Smokeless tobacco: Never Used   Substance and Sexual Activity    Alcohol use: Never    Drug use: Never    Sexual activity: None   Other Topics Concern    None   Social History Narrative    None     Social Determinants of Health     Financial Resource Strain:     Difficulty of Paying Living Expenses: Not on file   Food Insecurity:     Worried About Running Out of Food in the Last Year: Not on file    Tyshawn of Food in the Last Year: Not on file   Transportation Needs:     Lack of Transportation (Medical): Not on file    Lack of Transportation (Non-Medical):  Not on file   Physical Activity:     Days of Exercise per Week: Not on file    Minutes of Exercise per Session: Not on file   Stress:     Feeling of Stress : Not on file   Social Connections:     Frequency of Communication with Friends and Family: Not on file    Frequency of Social Gatherings with Friends and Family: Not on file    Attends Mandaen Services: Not on file    Active Member of [address] [place] [address] or Organizations: Not on file    Attends Club or Organization Meetings: Not on file    Marital Status: Not on file   Intimate Partner Violence:     Fear of Current or Ex-Partner: Not on file    Emotionally Abused: Not on file    Physically Abused: Not on file    Sexually Abused: Not on file   Housing Stability:     Unable to Pay for Housing in the Last Year: Not on file    Number of [place] in the Last Year: Not on file    Unstable Housing in the Last Year: Not on file         PHYSICAL EXAM    (up to 7 for level 4, 8 or more for level 5)     ED Triage Vitals [01/03/22 1526]   BP Temp Temp src Pulse Resp SpO2 Height Weight   97/71 97.9 °F (36.6 °C) -- 92 19 94 % 5' 2\" (1.575 m) 150 lb (68 kg)       Physical Exam  General :Patient is awake, alert, oriented, in no acute distress, nontoxic appearing  HEENT: Pupils are equally round and reactive to light, EOMI, conjunctivae clear. Neck: Neck is supple, full range of motion, trachea midline  Cardiac: Heart regular rate, rhythm, no murmurs, rubs, or gallops  Lungs: Lungs are clear to auscultation, there is no wheezing, rhonchi, or rales. There is no use of accessory muscles. Chest wall: There is no tenderness to palpation over the chest wall or over ribs  Abdomen: Abdomen is soft, tender on the lower abdominal wall around the area of the fistula the fistula is draining fairly large amount of stool and she has excoriation surrounding the patella musculoskeletal: 5 out of 5 strength in all 4 extremities. No focal muscle deficits are appreciated  Neuro:  Motor intact, sensory intact, level of consciousness is normal, Dermatology: Skin is warm and dry fistula tract on the lower abdominal wall as described above psych: Mentation is grossly normal, cognition is grossly normal. Affect is appropriate. DIAGNOSTIC RESULTS     EKG: All EKG's are interpreted by the Emergency Department Physician who either signs or Co-signs this chart in the [address] a cardiologist.    The EKG interpreted by me shows sinus tachycardia rate of 106 with no acute ST changes.     RADIOLOGY:   Non-plain film images such as CT, Ultrasound and MRI are read by the radiologist. Plain radiographic images are visualized and preliminarily interpreted by the emergency physician with the below findings:      ? Radiologist's Report Reviewed:  No orders to display         ED BEDSIDE ULTRASOUND:   Performed by ED Physician - none    LABS:    I have reviewed and interpreted all of the currently available lab results from this visit (ifapplicable):  Results for orders placed or performed during the hospital encounter of 01/03/22   CBC Auto Differential   Result Value Ref Range    WBC 11.8 (H) 4.0 - 11.0 K/uL    RBC 4.40 3.80 - 5.80 M/uL    Hemoglobin 13.2 11.5 - 16.5 g/dL    Hematocrit 40.3 37.0 - 47.0 %    MCV 91.6 80.0 - 100.0 fL    MCH 30.0 27.0 - 32.0 pg    MCHC 32.8 31.0 - 35.0 g/dL    RDW 14.4 11.0 - 16.0 %    Platelets 291 039 - 516 K/uL    MPV 9.3 6.0 - 10.0 fL    Neutrophils % 73.5 %    Immature Granulocytes % 0.6 0.0 - 5.0 %    Lymphocytes % 19.3 %    Monocytes % 5.8 %    Eosinophils % 0.3 %    Basophils % 0.5 %    Neutrophils Absolute 8.7 (H) 2.0 - 7.5 K/uL    Immature Granulocytes # 0.1 K/uL    Lymphocytes Absolute 2.3 1.5 - 4.0 K/uL    Monocytes Absolute 0.7 0.2 - 0.8 K/uL    Eosinophils Absolute 0.0 0.0 - 0.4 K/uL    Basophils Absolute 0.1 0.0 - 0.1 K/uL   Comprehensive Metabolic Panel w/ Reflex to MG   Result Value Ref Range    Sodium 133 (L) 136 - 145 mmol/L    Potassium reflex Magnesium 4.0 3.4 - 5.1 mmol/L    Chloride 95 (L) 98 - 107 mmol/L    CO2 20 20 - 30 mmol/L    Anion Gap 18 (H) 3 - 16 Glucose 111 (H) 74 - 106 mg/dL    BUN 69 (H) 6 - 20 mg/dL    CREATININE 2.3 (H) 0.4 - 1.2 mg/dL    GFR Non-African American 22 (L) >59    GFR  26 (L) >59    Calcium 9.5 8.5 - 10.5 mg/dL    Total Protein 7.6 6.4 - 8.3 g/dL    Albumin 3.8 3.4 - 4.8 g/dL    Albumin/Globulin Ratio 1.0 0.8 - 2.0    Total Bilirubin 0.4 0.3 - 1.2 mg/dL    Alkaline Phosphatase 140 (H) 25 - 100 U/L    ALT 45 (H) 4 - 36 U/L    AST 49 (H) 8 - 33 U/L    Globulin 3.8 Not Established g/dL   Lipase   Result Value Ref Range    Lipase 48.0 5.6 - 51.3 U/L        All other labs were within normal range or not returned as of this dictation. EMERGENCY DEPARTMENT COURSE and DIFFERENTIAL DIAGNOSIS/MDM:   Vitals:    Vitals:    01/03/22 1713 01/03/22 1714 01/03/22 1715 01/03/22 1724   BP: (!) 65/53  (!) 55/35    Pulse: 85 93 99 87   Resp: 20 19 23 23   Temp:       SpO2:       Weight:       Height:           MEDICATIONS ADMINISTERED IN ED:  Medications   0.9 % sodium chloride bolus (1,000 mLs IntraVENous New Bag 1/3/22 1730)   0.9 % sodium chloride bolus (has no administration in time range)   lactated ringers bolus (0 mLs IntraVENous Stopped 1/3/22 1708)       Patient still does not feel like peeing after 3 L of IV fluids her white count is barely elevated which it will be with out long-term fistula in her abdomen but her BUN is significantly elevated at 69 creatinine is elevated also 2.3 but is a prerenal azotemia therefore I feel patient will be corrected over time with IV fluids but since she is requiring such a large amount of IV fluids I feel that it would be best if we admit her overnight for continued hydration and monitoring of her electrolytes. Patient agrees to stay I am awaiting hospitalist to return my call. I spoke to the hospitalist and we will go ahead and admit the patient 23-hour hold for further hydration and reevaluation of her electrolytes and renal status.   The patient will follow-up with their PCP in 1-2 days for reevaluation. If the patient or family members have anyfurther concerns or any worsening symptoms they will return to the ED for reevaluation. CONSULTS:  None    PROCEDURES:  Procedures    CRITICAL CARE TIME    Total Critical Care time was 0 minutes, excluding separately reportable procedures. There was a high probability of clinically significant/life threatening deterioration in the patient's condition which required my urgent intervention. FINAL IMPRESSION      1. Dehydration New Problem         DISPOSITION/PLAN   DISPOSITION Decision To Admit 01/03/2022 06:28:01 PM  Admit to hospital    PATIENT REFERRED TO:  No follow-up provider specified. DISCHARGE MEDICATIONS:  New Prescriptions    No medications on file       Comment: Please note this report has been produced using speech recognition software and may contain errorsrelated to that system including errors in grammar, punctuation, and spelling, as well as words and phrases that may be inappropriate. If there are any questions or concerns please feel free to contact the dictating providerfor clarification.     Belem Mcguire MD  Attending Emergency Physician              Belem Mcguire MD  01/03/22 0624

## 2022-01-03 NOTE — ED NOTES
Patient to be admitted to medical unit for observation. Patient will go to room 3-2.      Chandrakant Thomas RN  01/03/22 0162

## 2022-01-04 VITALS
DIASTOLIC BLOOD PRESSURE: 79 MMHG | HEART RATE: 71 BPM | WEIGHT: 150 LBS | TEMPERATURE: 98.1 F | OXYGEN SATURATION: 98 % | HEIGHT: 62 IN | RESPIRATION RATE: 16 BRPM | SYSTOLIC BLOOD PRESSURE: 115 MMHG | BODY MASS INDEX: 27.6 KG/M2

## 2022-01-04 PROBLEM — L98.8 FISTULA: Status: ACTIVE | Noted: 2022-01-04

## 2022-01-04 PROBLEM — N17.9 ACUTE RENAL FAILURE (ARF) (HCC): Status: ACTIVE | Noted: 2022-01-04

## 2022-01-04 PROBLEM — E55.9 VITAMIN D DEFICIENCY, UNSPECIFIED: Status: ACTIVE | Noted: 2022-01-04

## 2022-01-04 PROBLEM — E78.5 HLD (HYPERLIPIDEMIA): Status: ACTIVE | Noted: 2022-01-04

## 2022-01-04 PROBLEM — E03.9 HYPOTHYROID: Status: ACTIVE | Noted: 2022-01-04

## 2022-01-04 LAB
A/G RATIO: 1 (ref 0.8–2)
ALBUMIN SERPL-MCNC: 2.9 G/DL (ref 3.4–4.8)
ALP BLD-CCNC: 113 U/L (ref 25–100)
ALT SERPL-CCNC: 32 U/L (ref 4–36)
ANION GAP SERPL CALCULATED.3IONS-SCNC: 12 MMOL/L (ref 3–16)
AST SERPL-CCNC: 35 U/L (ref 8–33)
BILIRUB SERPL-MCNC: 0.4 MG/DL (ref 0.3–1.2)
BUN BLDV-MCNC: 48 MG/DL (ref 6–20)
CALCIUM SERPL-MCNC: 8.6 MG/DL (ref 8.5–10.5)
CHLORIDE BLD-SCNC: 106 MMOL/L (ref 98–107)
CO2: 21 MMOL/L (ref 20–30)
CREAT SERPL-MCNC: 1.4 MG/DL (ref 0.4–1.2)
FOLATE: >20 NG/ML
GFR AFRICAN AMERICAN: 46
GFR NON-AFRICAN AMERICAN: 38
GLOBULIN: 3 G/DL
GLUCOSE BLD-MCNC: 84 MG/DL (ref 74–106)
HCT VFR BLD CALC: 35.5 % (ref 37–47)
HEMOGLOBIN: 10.9 G/DL (ref 11.5–16.5)
LACTIC ACID: 1.8 MMOL/L (ref 0.4–2)
MCH RBC QN AUTO: 29.4 PG (ref 27–32)
MCHC RBC AUTO-ENTMCNC: 30.7 G/DL (ref 31–35)
MCV RBC AUTO: 95.7 FL (ref 80–100)
PDW BLD-RTO: 14.3 % (ref 11–16)
PLATELET # BLD: 244 K/UL (ref 150–400)
PMV BLD AUTO: 9.7 FL (ref 6–10)
POTASSIUM REFLEX MAGNESIUM: 3.6 MMOL/L (ref 3.4–5.1)
RBC # BLD: 3.71 M/UL (ref 3.8–5.8)
SODIUM BLD-SCNC: 139 MMOL/L (ref 136–145)
TOTAL PROTEIN: 5.9 G/DL (ref 6.4–8.3)
TSH SERPL DL<=0.05 MIU/L-ACNC: 2.51 UIU/ML (ref 0.27–4.2)
VITAMIN B-12: 601 PG/ML (ref 211–911)
WBC # BLD: 7.9 K/UL (ref 4–11)

## 2022-01-04 PROCEDURE — 97165 OT EVAL LOW COMPLEX 30 MIN: CPT

## 2022-01-04 PROCEDURE — 84443 ASSAY THYROID STIM HORMONE: CPT

## 2022-01-04 PROCEDURE — 96361 HYDRATE IV INFUSION ADD-ON: CPT

## 2022-01-04 PROCEDURE — 97161 PT EVAL LOW COMPLEX 20 MIN: CPT

## 2022-01-04 PROCEDURE — 96372 THER/PROPH/DIAG INJ SC/IM: CPT

## 2022-01-04 PROCEDURE — G0378 HOSPITAL OBSERVATION PER HR: HCPCS

## 2022-01-04 PROCEDURE — 99235 HOSP IP/OBS SAME DATE MOD 70: CPT | Performed by: INTERNAL MEDICINE

## 2022-01-04 PROCEDURE — 82746 ASSAY OF FOLIC ACID SERUM: CPT

## 2022-01-04 PROCEDURE — 6370000000 HC RX 637 (ALT 250 FOR IP): Performed by: NURSE PRACTITIONER

## 2022-01-04 PROCEDURE — 83605 ASSAY OF LACTIC ACID: CPT

## 2022-01-04 PROCEDURE — 2580000003 HC RX 258: Performed by: NURSE PRACTITIONER

## 2022-01-04 PROCEDURE — 80053 COMPREHEN METABOLIC PANEL: CPT

## 2022-01-04 PROCEDURE — 82607 VITAMIN B-12: CPT

## 2022-01-04 PROCEDURE — 36415 COLL VENOUS BLD VENIPUNCTURE: CPT

## 2022-01-04 PROCEDURE — 85027 COMPLETE CBC AUTOMATED: CPT

## 2022-01-04 PROCEDURE — 6360000002 HC RX W HCPCS: Performed by: NURSE PRACTITIONER

## 2022-01-04 RX ORDER — SUMATRIPTAN 25 MG/1
25 TABLET, FILM COATED ORAL
COMMUNITY

## 2022-01-04 RX ORDER — ZINC OXIDE AND DIMETHICONE 120; 10 MG/G; MG/G
CREAM TOPICAL
Qty: 142 G | Refills: 1 | Status: SHIPPED | OUTPATIENT
Start: 2022-01-04

## 2022-01-04 RX ORDER — PANTOPRAZOLE SODIUM 40 MG/1
40 TABLET, DELAYED RELEASE ORAL 2 TIMES DAILY
COMMUNITY

## 2022-01-04 RX ORDER — ATORVASTATIN CALCIUM 20 MG/1
20 TABLET, FILM COATED ORAL DAILY
COMMUNITY

## 2022-01-04 RX ORDER — LEVOTHYROXINE SODIUM 0.12 MG/1
125 TABLET ORAL DAILY
COMMUNITY

## 2022-01-04 RX ORDER — CHOLECALCIFEROL (VITAMIN D3) 1250 MCG
1 CAPSULE ORAL WEEKLY
COMMUNITY

## 2022-01-04 RX ORDER — ZINC OXIDE AND DIMETHICONE 120; 10 MG/G; MG/G
CREAM TOPICAL 2 TIMES DAILY PRN
Status: DISCONTINUED | OUTPATIENT
Start: 2022-01-04 | End: 2022-01-04 | Stop reason: HOSPADM

## 2022-01-04 RX ORDER — ALLOPURINOL 300 MG/1
300 TABLET ORAL DAILY
COMMUNITY

## 2022-01-04 RX ORDER — LANOLIN/MINERAL OIL
LOTION (ML) TOPICAL DAILY
Status: DISCONTINUED | OUTPATIENT
Start: 2022-01-04 | End: 2022-01-04 | Stop reason: HOSPADM

## 2022-01-04 RX ORDER — POTASSIUM CHLORIDE 20 MEQ/1
20 TABLET, EXTENDED RELEASE ORAL 2 TIMES DAILY
COMMUNITY
Start: 2021-12-10

## 2022-01-04 RX ADMIN — HYDROCODONE BITARTRATE AND ACETAMINOPHEN 1 TABLET: 7.5; 325 TABLET ORAL at 09:03

## 2022-01-04 RX ADMIN — ALLOPURINOL 100 MG: 100 TABLET ORAL at 09:03

## 2022-01-04 RX ADMIN — SODIUM CHLORIDE: 9 INJECTION, SOLUTION INTRAVENOUS at 06:07

## 2022-01-04 RX ADMIN — Medication 2000 UNITS: at 09:03

## 2022-01-04 RX ADMIN — ASPIRIN 81 MG: 81 TABLET, COATED ORAL at 09:03

## 2022-01-04 RX ADMIN — Medication: at 12:58

## 2022-01-04 RX ADMIN — FAMOTIDINE 10 MG: 20 TABLET, FILM COATED ORAL at 09:03

## 2022-01-04 RX ADMIN — ENOXAPARIN SODIUM 30 MG: 100 INJECTION SUBCUTANEOUS at 09:03

## 2022-01-04 RX ADMIN — ATORVASTATIN CALCIUM 40 MG: 40 TABLET, FILM COATED ORAL at 09:02

## 2022-01-04 RX ADMIN — Medication 10 ML: at 10:05

## 2022-01-04 ASSESSMENT — PAIN DESCRIPTION - LOCATION: LOCATION: ABDOMEN

## 2022-01-04 ASSESSMENT — PAIN DESCRIPTION - PAIN TYPE: TYPE: CHRONIC PAIN

## 2022-01-04 ASSESSMENT — PAIN SCALES - GENERAL: PAINLEVEL_OUTOF10: 8

## 2022-01-04 NOTE — DISCHARGE SUMMARY
Discharge Summary  Short stay summary    Patient ID: Shirin Nelson      Patient's PCP: RYAN Marr CNP    Admit Date: 1/3/2022     Discharge Date: 1/4/2022     Admitting Physician: Franklyn Davis MD    Discharge Physician: RYAN Guerrero CNP     Reason for this admission:   Dehydration, acute renal failure    Discharge Diagnoses: Active Hospital Problems    Diagnosis Date Noted    HLD (hyperlipidemia) [E78.5] 01/04/2022    Hypothyroid [E03.9] 01/04/2022    Vitamin D deficiency, unspecified [E55.9] 01/04/2022    Acute renal failure (ARF) ([facility] [address] [N17.9] 01/04/2022    Dehydration [E86.0] 01/03/2022       Procedures: none    Consults:   None    HISTORY OF PRESENT ILLNESS:   The patient is a 63 y.o. female PMH Chronic Enterocutaneous fistula abdomen, HLD, hypothyroid, vit d def, acute renal failure who presented to ED with c/o dizziness, fatigue, weakness, decreased appetite several days. Had labs at PCP and abnormal so sent to ED. Has chronic Enterocutaneous fistula abdomen for about 4 years and it has been more red and draining several months. Home health helps her with it. Does dressing changes herself at home and was using a cream that helped the redness significantly improve but cannot think of name. Has not seen specialist in years. In ED, WBC 11.8, BUN 69. Creat 2.3. Covid neg. Was given almost 3L IVF in ED with slight improvement. Admitted for dehydration, ARF for further evaluation and treatment. Today, ambulating in room with PT/OT upon assessment. They report she done great with no syncope or c/o dizziness. Pt reports she feels much better and ready to go home. No further dizziness. Tolerating oral intake. Having UOP. NAD.       ROS  Constitutional:  Denies fever or chills   Eyes:  Denies eye pain or redness  HENT:  Denies nasal congestion or sore throat   Respiratory:  Denies cough or shortness of breath   Cardiovascular:  Denies chest pain or edema   GI:  Denies abdominal pain, nausea, vomiting, bloody stools or diarrhea   :  Denies dysuria or frequency  Musculoskeletal:  Denies acute neck pain or body aches  Integument:  Denies rash or itching. +Fistula site more red and draining  Neurologic:  Denies headache, dizziness, numbness, tingling or unilateral weakness  Psychiatric:  Denies acute depression or acute anxiety      Past Medical History:      Diagnosis Date    Hyperlipidemia     Thyroid disease        Past Surgical History:      Procedure Laterality Date    ABDOMEN SURGERY      CHOLECYSTECTOMY      HERNIA REPAIR      HYSTERECTOMY         Social History:   TOBACCO:   reports that she has been smoking cigarettes. She has been smoking about 0.50 packs per day. She has never used smokeless tobacco.  ETOH:   reports no history of alcohol use. OCCUPATION:  none    Family History:   History reviewed. No pertinent family history. Allergies:  Penicillins and Sulfa antibiotics    Medications Prior to Admission:    Prior to Admission medications    Medication Sig Start Date End Date Taking? Authorizing Provider   allopurinol (ZYLOPRIM) 300 MG tablet Take 300 mg by mouth daily   Yes Historical Provider, MD   atorvastatin (LIPITOR) 20 MG tablet Take 20 mg by mouth daily   Yes Historical Provider, MD   levothyroxine (SYNTHROID) 125 MCG tablet Take 125 mcg by mouth Daily   Yes Historical Provider, MD   pantoprazole (PROTONIX) 40 MG tablet Take 40 mg by mouth 2 times daily   Yes Historical Provider, MD   potassium chloride (KLOR-CON M) 20 MEQ extended release tablet Take 20 mEq by mouth 2 times daily  12/10/21  Yes Historical Provider, MD   SUMAtriptan (IMITREX) 25 MG tablet Take 25 mg by mouth once as needed for Migraine   Yes Historical Provider, MD   Cholecalciferol (VITAMIN D3) 1.25 MG (27044 UT) CAPS Take by mouth Take on friday   Yes Historical Provider, MD   HYDROcodone-acetaminophen (NORCO)  MG per tablet Take 1 tablet by mouth three times daily.    Yes Historical Provider, MD   Multiple Vitamin (MULTIVITAMIN ADULT PO) Take by mouth   Yes Historical Provider, MD   ondansetron (ZOFRAN) 4 MG tablet Take 4 mg by mouth every 8 hours as needed for Nausea or Vomiting   Yes Historical Provider, MD   aspirin (EQ ASPIRIN ADULT LOW DOSE) 81 MG EC tablet Take 81 mg by mouth daily 8/10/17  Yes Historical Provider, MD       Vitals:   Rebecca Jocelyn:    01/04/22 0721   BP: 106/73   Pulse: 78   Resp: 18   Temp: 97.7 °F (36.5 °C)   SpO2: 98%       PE  Constitutional:  Well developed, well nourished, no acute distress  Eyes:  PERRL, no scleral icterus, conjunctiva normal   HENT:  Atraumatic, external ears normal, nose normal, oropharynx moist, no pharyngeal exudates. Neck- supple, no JVD  Respiratory:  No respiratory distress, no wheezing, rales or rhonchi detected  Cardiovascular:  Normal rate, normal rhythm, no murmurs, no gallops, no rubs, no edema   GI:  Soft, nondistended, normal bowel sounds,  no voluntary guarding. TTP around fistula site, no crepitus. Musculoskeletal:  No cyanosis or obvious acute deformity. Moving all extremities   Integument:  Warm and dry. Umbilical area fistula with moderate erythema, skin excoriation, drainage. No purulent drainage.    Neurologic:  Alert & oriented x 3, no apparent focal deficits noted   Psychiatric:  Speech and behavior appropriate        Lab Results   Component Value Date    WBC 7.9 01/04/2022    HGB 10.9 (L) 01/04/2022    HCT 35.5 (L) 01/04/2022    MCV 95.7 01/04/2022     01/04/2022     Lab Results   Component Value Date     01/04/2022    K 3.6 01/04/2022     01/04/2022    CO2 21 01/04/2022    BUN 48 (H) 01/04/2022    CREATININE 1.4 (H) 01/04/2022    GLUCOSE 84 01/04/2022    CALCIUM 8.6 01/04/2022    PROT 5.9 (L) 01/04/2022    LABALBU 2.9 (L) 01/04/2022    BILITOT 0.4 01/04/2022    ALKPHOS 113 (H) 01/04/2022    AST 35 (H) 01/04/2022    ALT 32 01/04/2022    LABGLOM 38 (L) 01/04/2022    GFRAA 46 (L) 01/04/2022    AGRATIO 1.0 01/04/2022    GLOB 3.0 01/04/2022       EKG: SR 70s    Assessment and Plan/Hospital course: Active Hospital Problems    Diagnosis Date Noted    HLD (hyperlipidemia) [E78.5]  Cont home statin. Lifestyle modifications   01/04/2022    Hypothyroid [E03.9]  Cont home synthroid. 01/04/2022    Vitamin D deficiency, unspecified [E55.9]  Cont oral replacement. 01/04/2022    Acute renal failure (ARF) ([facility] [address] [N17.9]  In ED 1/3 BUN 69, creat 2.3. Given fluid boluses and IV fluids with improvement. -1/4 BUN 48, creat 1.4.    -Improving and feeling better. No further dizziness. Tolerating oral intake and requesting to go home.   -encouraged adequate intake. 01/04/2022    Dehydration [E86.0]  See above 01/03/2022   · Fistula  Chronic Enterocutaneous fistula abdomen. Wound care. HH to follow close. Case management following. Disposition: home    Discharged Condition: Stable    Code Status: Full Code    Activity: activity as tolerated    Diet: regular diet    Follow Up: Primary Care Physician in 1-2 weeks for CBC, CMP. Home health to continue to monitor fistula site, wound care. Discharge Medications:     Current Discharge Medication List           Details   dimethicone-zinc oxide (NAE PROTECT) cream Apply topically 2 times daily as needed.   Qty: 142 g, Refills: 1    Associated Diagnoses: Fistula              Details   allopurinol (ZYLOPRIM) 300 MG tablet Take 300 mg by mouth daily      atorvastatin (LIPITOR) 20 MG tablet Take 20 mg by mouth daily      levothyroxine (SYNTHROID) 125 MCG tablet Take 125 mcg by mouth Daily      pantoprazole (PROTONIX) 40 MG tablet Take 40 mg by mouth 2 times daily      potassium chloride (KLOR-CON M) 20 MEQ extended release tablet Take 20 mEq by mouth 2 times daily       SUMAtriptan (IMITREX) 25 MG tablet Take 25 mg by mouth once as needed for Migraine      Cholecalciferol (VITAMIN D3) 1.25 MG (47906 UT) CAPS Take by mouth Take on friday HYDROcodone-acetaminophen (NORCO)  MG per tablet Take 1 tablet by mouth three times daily. Multiple Vitamin (MULTIVITAMIN ADULT PO) Take by mouth      ondansetron (ZOFRAN) 4 MG tablet Take 4 mg by mouth every 8 hours as needed for Nausea or Vomiting      aspirin (EQ ASPIRIN ADULT LOW DOSE) 81 MG EC tablet Take 81 mg by mouth daily                 Patient was seen and examined by Dr. Tiffany Stephens and plan of care reviewed. Signed:  Electronically signed by RYAN Martinez CNP on 1/4/2022 at 11:00 AM       Thank you RYAN Marsh CNP for the opportunity to be involved in this patient's care. If you have any questions or concerns please feel free to contact me at [phone].

## 2022-01-04 NOTE — PLAN OF CARE
Problem: Safety:  Goal: Free from accidental physical injury  Description: Free from accidental physical injury  Outcome: Ongoing     Problem: Daily Care:  Goal: Daily care needs are met  Description: Daily care needs are met  1/4/2022 0204 by Julia Butts RN  Outcome: Ongoing  1/3/2022 2326 by Julia Butts RN  Outcome: Ongoing     Problem: Skin Integrity:  Goal: Skin integrity will stabilize  Description: Skin integrity will stabilize  Outcome: Ongoing     Problem: Discharge Planning:  Goal: Patients continuum of care needs are met  Description: Patients continuum of care needs are met  1/4/2022 0204 by Julia Butts RN  Outcome: Ongoing  1/3/2022 2326 by Julia Butts RN  Outcome: Ongoing

## 2022-01-04 NOTE — DISCHARGE INSTR - DIET

## 2022-01-04 NOTE — PROGRESS NOTES
Access: Stairs to enter without rails  Entrance Stairs - Number of Steps: 2  Bathroom Shower/Tub: Walk-in shower  Bathroom Toilet: Handicap height  Bathroom Equipment: Shower chair,3-in-1 commode  Bathroom Accessibility: Walker accessible  Home Equipment: [address] Help From: Home health ([facility])  ADL Assistance: Independent  Homemaking Assistance: Independent (Pt states recently she has had increased difficulty with housekeeping and cooking tasks.)  Homemaking Responsibilities: Yes  Ambulation Assistance: Independent  Transfer Assistance: Independent  Active : Yes       Objective   Vision: Impaired  Vision Exceptions: Wears glasses at all times  Hearing: Within functional limits    Orientation  Overall Orientation Status: Within Functional Limits  Observation/Palpation  Observation: Pt lying in bed, NAD, Room air, pleasant and cooperative  Balance  Sitting Balance: Independent  Standing Balance: Supervision  Functional Mobility  Functional - Mobility Device: No device  Activity: To/from bathroom  Assist Level: Stand by assistance  Functional Mobility Comments: Pt ambulated to and from bathroom and in room SBA no reports of dizziness.   ADL  LE Dressing: Independent  Toileting: Independent  Tone RUE  RUE Tone: Normotonic  Tone LUE  LUE Tone: Normotonic  Coordination  Movements Are Fluid And Coordinated: Yes     Bed mobility  Rolling to Left: Independent  Supine to Sit: Independent  Scooting: Independent  Transfers  Stand Pivot Transfers: Supervision  Sit to stand: Supervision     Cognition  Overall Cognitive Status: WFL                 LUE AROM (degrees)  LUE AROM : WFL  RUE AROM (degrees)  RUE AROM : WFL  LUE Strength  L Shoulder Flex: 4+/5  L Elbow Flex: 4+/5  L Elbow Ext: 4+/5  L Hand General: 4+/5  RUE Strength  R Shoulder Flex: 4+/5  R Elbow Flex: 4+/5  R Elbow Ext: 4+/5  R Hand General: 4+/5    Therapy Time   Individual Concurrent Group Co-treatment   Time In 0915         Time Out 4924         Minutes 19              This note serves as a DC summary in the event of pt discharge.      Agata Jackson, OTR/L

## 2022-01-04 NOTE — H&P
History and Physical  Short stay summary    Patient ID: Dennys Gonzalez      Patient's PCP: RYAN Keller CNP    Admit Date: 1/3/2022     Discharge Date: 1/4/2022     Admitting Physician: Melany Mcdowell MD    Discharge Physician: RYAN Daniels CNP     Reason for this admission:   Dehydration, acute renal failure    Discharge Diagnoses: Active Hospital Problems    Diagnosis Date Noted    HLD (hyperlipidemia) [E78.5] 01/04/2022    Hypothyroid [E03.9] 01/04/2022    Vitamin D deficiency, unspecified [E55.9] 01/04/2022    Acute renal failure (ARF) ([facility] [address] [N17.9] 01/04/2022    Dehydration [E86.0] 01/03/2022       Procedures: none    Consults:   None    HISTORY OF PRESENT ILLNESS:   The patient is a 63 y.o. female PMH Chronic Enterocutaneous fistula abdomen, HLD, hypothyroid, vit d def, acute renal failure who presented to ED with c/o dizziness, fatigue, weakness, decreased appetite several days. Had labs at PCP and abnormal so sent to ED. Has chronic Enterocutaneous fistula abdomen for about 4 years and it has been more red and draining several months. Home health helps her with it. Does dressing changes herself at home and was using a cream that helped the redness significantly improve but cannot think of name. Has not seen specialist in years. In ED, WBC 11.8, BUN 69. Creat 2.3. Covid neg. Was given almost 3L IVF in ED with slight improvement. Admitted for dehydration, ARF for further evaluation and treatment. Today, ambulating in room with PT/OT upon assessment. They report she done great with no syncope or c/o dizziness. Pt reports she feels much better and ready to go home. No further dizziness. Tolerating oral intake. Having UOP. NAD.       ROS  Constitutional:  Denies fever or chills   Eyes:  Denies eye pain or redness  HENT:  Denies nasal congestion or sore throat   Respiratory:  Denies cough or shortness of breath   Cardiovascular:  Denies chest pain or edema   GI:  Denies abdominal pain, nausea, vomiting, bloody stools or diarrhea   :  Denies dysuria or frequency  Musculoskeletal:  Denies acute neck pain or body aches  Integument:  Denies rash or itching. +Fistula site more red and draining  Neurologic:  Denies headache, dizziness, numbness, tingling or unilateral weakness  Psychiatric:  Denies acute depression or acute anxiety      Past Medical History:      Diagnosis Date    Hyperlipidemia     Thyroid disease        Past Surgical History:      Procedure Laterality Date    ABDOMEN SURGERY      CHOLECYSTECTOMY      HERNIA REPAIR      HYSTERECTOMY         Social History:   TOBACCO:   reports that she has been smoking cigarettes. She has been smoking about 0.50 packs per day. She has never used smokeless tobacco.  ETOH:   reports no history of alcohol use. OCCUPATION:  none    Family History:   History reviewed. No pertinent family history. Allergies:  Penicillins and Sulfa antibiotics    Medications Prior to Admission:    Prior to Admission medications    Medication Sig Start Date End Date Taking? Authorizing Provider   allopurinol (ZYLOPRIM) 300 MG tablet Take 300 mg by mouth daily   Yes Historical Provider, MD   atorvastatin (LIPITOR) 20 MG tablet Take 20 mg by mouth daily   Yes Historical Provider, MD   levothyroxine (SYNTHROID) 125 MCG tablet Take 125 mcg by mouth Daily   Yes Historical Provider, MD   pantoprazole (PROTONIX) 40 MG tablet Take 40 mg by mouth 2 times daily   Yes Historical Provider, MD   potassium chloride (KLOR-CON M) 20 MEQ extended release tablet Take 20 mEq by mouth 2 times daily  12/10/21  Yes Historical Provider, MD   SUMAtriptan (IMITREX) 25 MG tablet Take 25 mg by mouth once as needed for Migraine   Yes Historical Provider, MD   Cholecalciferol (VITAMIN D3) 1.25 MG (05443 UT) CAPS Take by mouth Take on friday   Yes Historical Provider, MD   HYDROcodone-acetaminophen (NORCO)  MG per tablet Take 1 tablet by mouth three times daily.    Yes Historical Provider, MD   Multiple Vitamin (MULTIVITAMIN ADULT PO) Take by mouth   Yes Historical Provider, MD   ondansetron (ZOFRAN) 4 MG tablet Take 4 mg by mouth every 8 hours as needed for Nausea or Vomiting   Yes Historical Provider, MD   aspirin (EQ ASPIRIN ADULT LOW DOSE) 81 MG EC tablet Take 81 mg by mouth daily 8/10/17  Yes Historical Provider, MD       Vitals:   Ashvin Carson:    01/04/22 0721   BP: 106/73   Pulse: 78   Resp: 18   Temp: 97.7 °F (36.5 °C)   SpO2: 98%       PE  Constitutional:  Well developed, well nourished, no acute distress  Eyes:  PERRL, no scleral icterus, conjunctiva normal   HENT:  Atraumatic, external ears normal, nose normal, oropharynx moist, no pharyngeal exudates. Neck- supple, no JVD  Respiratory:  No respiratory distress, no wheezing, rales or rhonchi detected  Cardiovascular:  Normal rate, normal rhythm, no murmurs, no gallops, no rubs, no edema   GI:  Soft, nondistended, normal bowel sounds,  no voluntary guarding. TTP around fistula site, no crepitus. Musculoskeletal:  No cyanosis or obvious acute deformity. Moving all extremities   Integument:  Warm and dry. Umbilical area fistula with moderate erythema, skin excoriation, drainage. No purulent drainage.    Neurologic:  Alert & oriented x 3, no apparent focal deficits noted   Psychiatric:  Speech and behavior appropriate        Lab Results   Component Value Date    WBC 7.9 01/04/2022    HGB 10.9 (L) 01/04/2022    HCT 35.5 (L) 01/04/2022    MCV 95.7 01/04/2022     01/04/2022     Lab Results   Component Value Date     01/04/2022    K 3.6 01/04/2022     01/04/2022    CO2 21 01/04/2022    BUN 48 (H) 01/04/2022    CREATININE 1.4 (H) 01/04/2022    GLUCOSE 84 01/04/2022    CALCIUM 8.6 01/04/2022    PROT 5.9 (L) 01/04/2022    LABALBU 2.9 (L) 01/04/2022    BILITOT 0.4 01/04/2022    ALKPHOS 113 (H) 01/04/2022    AST 35 (H) 01/04/2022    ALT 32 01/04/2022    LABGLOM 38 (L) 01/04/2022    GFRAA 46 (L) 01/04/2022    AGRATIO 1.0 01/04/2022    GLOB 3.0 01/04/2022       EKG: SR 70s    Assessment and Plan/Hospital course: Active Hospital Problems    Diagnosis Date Noted    HLD (hyperlipidemia) [E78.5]  Cont home statin. Lifestyle modifications   01/04/2022    Hypothyroid [E03.9]  Cont home synthroid. 01/04/2022    Vitamin D deficiency, unspecified [E55.9]  Cont oral replacement. 01/04/2022    Acute renal failure (ARF) ([facility] [address] [N17.9]  In ED 1/3 BUN 69, creat 2.3. Given fluid boluses and IV fluids with improvement. -1/4 BUN 48, creat 1.4.    -Improving and feeling better. No further dizziness. Tolerating oral intake and requesting to go home.   -encouraged adequate intake. 01/04/2022    Dehydration [E86.0]  See above 01/03/2022   · Fistula  Chronic Enterocutaneous fistula abdomen. Wound care. HH to follow close. Case management following. Disposition: home    Discharged Condition: Stable    Code Status: Full Code    Activity: activity as tolerated    Diet: regular diet    Follow Up: Primary Care Physician in 1-2 weeks for CBC, CMP. Home health to continue to monitor fistula site, wound care. Discharge Medications:     Current Discharge Medication List           Details   dimethicone-zinc oxide (NAE PROTECT) cream Apply topically 2 times daily as needed.   Qty: 142 g, Refills: 1    Associated Diagnoses: Fistula              Details   allopurinol (ZYLOPRIM) 300 MG tablet Take 300 mg by mouth daily      atorvastatin (LIPITOR) 20 MG tablet Take 20 mg by mouth daily      levothyroxine (SYNTHROID) 125 MCG tablet Take 125 mcg by mouth Daily      pantoprazole (PROTONIX) 40 MG tablet Take 40 mg by mouth 2 times daily      potassium chloride (KLOR-CON M) 20 MEQ extended release tablet Take 20 mEq by mouth 2 times daily       SUMAtriptan (IMITREX) 25 MG tablet Take 25 mg by mouth once as needed for Migraine      Cholecalciferol (VITAMIN D3) 1.25 MG (75553 UT) CAPS Take by mouth Take on friday 31-Jan-2018 HYDROcodone-acetaminophen (NORCO)  MG per tablet Take 1 tablet by mouth three times daily. Multiple Vitamin (MULTIVITAMIN ADULT PO) Take by mouth      ondansetron (ZOFRAN) 4 MG tablet Take 4 mg by mouth every 8 hours as needed for Nausea or Vomiting      aspirin (EQ ASPIRIN ADULT LOW DOSE) 81 MG EC tablet Take 81 mg by mouth daily                 Patient was seen and examined by Dr. Charmayne Balm and plan of care reviewed. Signed:  Electronically signed by RYAN Julien CNP on 1/4/2022 at 11:00 AM       Thank you Delphina Olszewski, APRN - CNP for the opportunity to be involved in this patient's care. If you have any questions or concerns please feel free to contact me at [phone].

## 2022-01-04 NOTE — PROGRESS NOTES
Pt arrived to floor from ED via stretcher. Patient A&O,able to ambulate from stretcher to bedside independently. Patient has an abdominal fistula that leaks fluid when she gets out of the bed. Patient redresses fistula independently. Dressing consist of several 4x4's covered with a trauma or abd pad. Vitals taken and recorded. Head to toe assessment done. Appropriate meds administered. HOB elevated. Call light with in reach. Will continue to monitor.

## 2022-01-04 NOTE — CARE COORDINATION
The Plan for Transition of Care is related to the following treatment goals: home with [facility]    The Patient and/or patient representative was provided with a choice of provider and agrees with the discharge plan. [x] Yes [] No    Freedom of choice list was provided with basic dialogue that supports the patient's individualized plan of care/goals, treatment preferences and shares the quality data associated with the providers. [x] Yes [] No    Pt has Sarita ROLLINS at baseline. Orders for home health sent to [facility]. No DME needs noted at this time.

## 2022-01-04 NOTE — ED NOTES
Phone report to [facility] [address], RN; requested transport to floor.       Jovanna Costa RN  01/03/22 2052

## 2022-01-04 NOTE — PROGRESS NOTES
Physical Therapy    Facility/Department: [facility] MED SURG  Initial Assessment    NAME: Eliseo Camops  : 1960  MRN: [id number]    Date of Service: 2022    Discharge Recommendations:  Home with assist PRN        Assessment   Body structures, Functions, Activity limitations: Decreased high-level IADLs  Assessment: Dehydration; high functional level; PT not indicated at this time  Treatment Diagnosis: Dehydration  Prognosis: Excellent  Decision Making: Low Complexity  REQUIRES PT FOLLOW UP: No  Activity Tolerance  Activity Tolerance: Patient Tolerated treatment well       Patient Diagnosis(es): The encounter diagnosis was Dehydration. has a past medical history of Hyperlipidemia and Thyroid disease. has a past surgical history that includes hernia repair; Cholecystectomy; Hysterectomy; and Abdomen surgery. Restrictions  Restrictions/Precautions  Restrictions/Precautions: General Precautions,Fall Risk  Required Braces or Orthoses?: No  Vision/Hearing  Vision: Impaired  Vision Exceptions: Wears glasses at all times  Hearing: Within functional limits     Subjective  General  Chart Reviewed: Yes  Patient assessed for rehabilitation services?: Yes  Response To Previous Treatment: Not applicable  Family / Caregiver Present: No  Referring Practitioner: Dave Zavala MD  Diagnosis: Dehydration  Follows Commands: Within Functional Limits  Subjective  Subjective: Patient agreeable to consult; states she became dehydrated causing her blood pressure to drop, feeling better today; normally independent with functional mobility.   Pain Screening  Patient Currently in Pain: Yes (stomach)          Orientation  Orientation  Overall Orientation Status: Within Normal Limits  Social/Functional History  Social/Functional History  Lives With: Spouse  Type of Home: Trailer  Home Layout: One level  Home Access: Stairs to enter without rails  Entrance Stairs - Number of Steps: 2  Bathroom Shower/Tub: Walk-in shower  Bathroom Toilet: Handicap height  Bathroom Equipment: Shower chair,3-in-1 commode  Bathroom Accessibility: Walker accessible  Home Equipment: Rolling walker,Cane  Receives Help From: Home health  ADL Assistance: Independent  Homemaking Assistance: Independent  Homemaking Responsibilities: Yes  Ambulation Assistance: Independent  Transfer Assistance: Independent  Active : Yes  Cognition        Objective     Observation/Palpation  Posture: Good  Observation: Pt lying in bed, NAD, Room air, pleasant and cooperative    AROM RLE (degrees)  RLE AROM: WFL  AROM LLE (degrees)  LLE AROM : WFL  AROM RUE (degrees)  RUE AROM : WFL  AROM LUE (degrees)  LUE AROM : WFL  Strength RLE  Strength RLE: WFL  Strength LLE  Strength LLE: WFL  Strength RUE  Strength RUE: WFL  Strength LUE  Strength LUE: WFL  Tone RLE  RLE Tone: Normotonic  Tone LLE  LLE Tone: Normotonic  Motor Control  Gross Motor?: WFL  Sensation  Overall Sensation Status: WFL  Bed mobility  Rolling to Left: Independent  Supine to Sit: Independent  Sit to Supine: Independent  Scooting: Independent  Transfers  Sit to Stand: Independent  Stand to sit:  Independent  Ambulation  Ambulation?: Yes  Ambulation 1  Surface: level tile  Device: No Device  Assistance: Independent  Gait Deviations: Slow Natalee  Distance: 40 feet in room, no problems or c/o     Balance  Posture: Good  Sitting - Static: Good  Sitting - Dynamic: Good  Standing - Static: Good  Standing - Dynamic: Good;-        Plan   Plan  Times per week: 1 visit  Plan weeks: 1 visit            Therapy Time   Individual Concurrent Group Co-treatment   Time In           Time Out           Minutes                   Vineet Smalls PT

## 2022-01-04 NOTE — ACP (ADVANCE CARE PLANNING)
Advance Care Planning     General Advance Care Planning (ACP) Conversation    Date of Conversation: 01/04/2022  Conducted with: Patient with Decision Making Capacity    Healthcare Decision Maker:    Primary Decision Maker: Jude Casiano - [phone]    Secondary Decision Maker: Dae Mccormick - Brother/Sister - [phone]  Click here to complete Healthcare Decision Makers including selection of the Healthcare Decision Maker Relationship (ie \"Primary\"). Today we documented Decision Maker(s) consistent with Legal Next of Kin hierarchy.     Content/Action Overview:  Has NO ACP documents/care preferences - information provided, considering goals and options  Reviewed DNR/DNI and patient elects Full Code (Attempt Resuscitation)  artificial nutrition, ventilation preferences and resuscitation preferences      Length of Voluntary ACP Conversation in minutes:  <16 minutes (Non-Billable)    Wallace Tomas

## 2022-01-04 NOTE — PROGRESS NOTES
Pharmacy Renal Adjustment    Papo Wallace is a 63 y.o. female. Pharmacy has renally adjusted medications per protocol. Recent Labs     01/03/22  1550   BUN 69*       Recent Labs     01/03/22  1550   CREATININE 2.3*       Estimated Creatinine Clearance: 24 mL/min (A) (based on SCr of 2.3 mg/dL (H)).     Height:   Ht Readings from Last 1 Encounters:   01/03/22 5' 2\" (1.575 m)     Weight:  Wt Readings from Last 1 Encounters:   01/03/22 150 lb (68 kg)     Plan: Adjust the following medications based on renal function:           Famotidine 20 mg by mouth daily to 10 mg daily    Electronically signed by Vy Zuñiga [facility] on 1/3/2022 at 10:52 PM

## 2022-01-04 NOTE — PROGRESS NOTES
Med rec performed utilizing fill history from [facility]. See notes below:  -Added the following med per fill history and patient: Vit D   -Changed Allopurinol 100mg to Allopurinol 300mg. Per fill history   -Changed Potassium PO to Potassium Chloride 20 ER. Per fill history.   -Changed Sumatriptan Inj to Sumatriptan 25 mg Tab PRN. Per fill history.   -Changed Levothyroxine Sodium PO to Levothyroxine 125mg. Per fill history.  -Changed Atorvastatin 40mg to Atorvastatin 20mg. Per fill history.   -Changed Pantoprazole PO to Pantoprazole 40mg Two times daily.      Gail Dodd CMA

## 2022-01-05 NOTE — PROGRESS NOTES
Pt discharged att, discharge documents given to patient and explained, patient stated understanding, patient vitals taken, telemetry removed, IV removed without incident, patient stable at time of discharge, patient left with all belongings, patient left with family member to personal vehicle.

## 2022-01-12 ENCOUNTER — HOSPITAL ENCOUNTER (OUTPATIENT)
Facility: HOSPITAL | Age: 62
Discharge: HOME | End: 2022-01-12
Payer: MEDICARE

## 2022-01-12 PROCEDURE — 80053 COMPREHEN METABOLIC PANEL: CPT

## 2022-01-12 PROCEDURE — 36415 COLL VENOUS BLD VENIPUNCTURE: CPT

## 2022-01-12 PROCEDURE — 85025 COMPLETE CBC W/AUTO DIFF WBC: CPT

## 2022-01-13 LAB
A/G RATIO: 1 (ref 0.8–2)
ALBUMIN SERPL-MCNC: 2.8 G/DL (ref 3.4–4.8)
ALP BLD-CCNC: 144 U/L (ref 25–100)
ALT SERPL-CCNC: 13 U/L (ref 4–36)
ANION GAP SERPL CALCULATED.3IONS-SCNC: 11 MMOL/L (ref 3–16)
AST SERPL-CCNC: 15 U/L (ref 8–33)
BASOPHILS ABSOLUTE: 0 K/UL (ref 0–0.1)
BASOPHILS RELATIVE PERCENT: 0.4 %
BILIRUB SERPL-MCNC: 0.3 MG/DL (ref 0.3–1.2)
BUN BLDV-MCNC: 11 MG/DL (ref 6–20)
CALCIUM SERPL-MCNC: 8.3 MG/DL (ref 8.5–10.5)
CHLORIDE BLD-SCNC: 108 MMOL/L (ref 98–107)
CO2: 18 MMOL/L (ref 20–30)
CREAT SERPL-MCNC: 0.8 MG/DL (ref 0.4–1.2)
EOSINOPHILS ABSOLUTE: 0.2 K/UL (ref 0–0.4)
EOSINOPHILS RELATIVE PERCENT: 2.2 %
GFR AFRICAN AMERICAN: >59
GFR NON-AFRICAN AMERICAN: >60
GLOBULIN: 2.8 G/DL
GLUCOSE BLD-MCNC: 97 MG/DL (ref 74–106)
HCT VFR BLD CALC: 38 % (ref 37–47)
HEMOGLOBIN: 11.2 G/DL (ref 11.5–16.5)
IMMATURE GRANULOCYTES #: 0 K/UL
IMMATURE GRANULOCYTES %: 0.2 % (ref 0–5)
LYMPHOCYTES ABSOLUTE: 2.6 K/UL (ref 1.5–4)
LYMPHOCYTES RELATIVE PERCENT: 31.7 %
MCH RBC QN AUTO: 29.7 PG (ref 27–32)
MCHC RBC AUTO-ENTMCNC: 29.5 G/DL (ref 31–35)
MCV RBC AUTO: 100.8 FL (ref 80–100)
MONOCYTES ABSOLUTE: 0.3 K/UL (ref 0.2–0.8)
MONOCYTES RELATIVE PERCENT: 4.1 %
NEUTROPHILS ABSOLUTE: 5 K/UL (ref 2–7.5)
NEUTROPHILS RELATIVE PERCENT: 61.4 %
PDW BLD-RTO: 15.9 % (ref 11–16)
PLATELET # BLD: 313 K/UL (ref 150–400)
PMV BLD AUTO: 10.2 FL (ref 6–10)
POTASSIUM SERPL-SCNC: 4.1 MMOL/L (ref 3.4–5.1)
RBC # BLD: 3.77 M/UL (ref 3.8–5.8)
SODIUM BLD-SCNC: 137 MMOL/L (ref 136–145)
TOTAL PROTEIN: 5.6 G/DL (ref 6.4–8.3)
WBC # BLD: 8.1 K/UL (ref 4–11)

## 2022-02-03 PROBLEM — E86.0 DEHYDRATION: Status: RESOLVED | Noted: 2022-01-03 | Resolved: 2022-02-03

## 2022-05-25 ENCOUNTER — HOSPITAL ENCOUNTER (OUTPATIENT)
Facility: HOSPITAL | Age: 62
Discharge: HOME | End: 2022-05-25
Payer: MEDICARE

## 2022-05-25 PROCEDURE — 80053 COMPREHEN METABOLIC PANEL: CPT

## 2022-05-25 PROCEDURE — 82607 VITAMIN B-12: CPT

## 2022-05-25 PROCEDURE — 84443 ASSAY THYROID STIM HORMONE: CPT

## 2022-05-25 PROCEDURE — 82746 ASSAY OF FOLIC ACID SERUM: CPT

## 2022-05-25 PROCEDURE — 36415 COLL VENOUS BLD VENIPUNCTURE: CPT

## 2022-05-25 PROCEDURE — 80061 LIPID PANEL: CPT

## 2022-05-25 PROCEDURE — 85025 COMPLETE CBC W/AUTO DIFF WBC: CPT

## 2022-05-26 LAB
A/G RATIO: 1 (ref 0.8–2)
ALBUMIN SERPL-MCNC: 3 G/DL (ref 3.4–4.8)
ALP BLD-CCNC: 200 U/L (ref 25–100)
ALT SERPL-CCNC: 18 U/L (ref 4–36)
ANION GAP SERPL CALCULATED.3IONS-SCNC: 14 MMOL/L (ref 3–16)
AST SERPL-CCNC: 22 U/L (ref 8–33)
BASOPHILS ABSOLUTE: 0.1 K/UL (ref 0–0.1)
BASOPHILS RELATIVE PERCENT: 0.4 %
BILIRUB SERPL-MCNC: 0.4 MG/DL (ref 0.3–1.2)
BUN BLDV-MCNC: 11 MG/DL (ref 6–20)
CALCIUM SERPL-MCNC: 8.6 MG/DL (ref 8.5–10.5)
CHLORIDE BLD-SCNC: 109 MMOL/L (ref 98–107)
CHOLESTEROL, TOTAL: 90 MG/DL (ref 0–200)
CO2: 20 MMOL/L (ref 20–30)
CREAT SERPL-MCNC: 0.7 MG/DL (ref 0.4–1.2)
EOSINOPHILS ABSOLUTE: 0.2 K/UL (ref 0–0.4)
EOSINOPHILS RELATIVE PERCENT: 1.3 %
FOLATE: >20 NG/ML
GFR AFRICAN AMERICAN: >59
GFR NON-AFRICAN AMERICAN: >60
GLOBULIN: 3 G/DL
GLUCOSE BLD-MCNC: 109 MG/DL (ref 74–106)
HCT VFR BLD CALC: 39.5 % (ref 37–47)
HDLC SERPL-MCNC: 41 MG/DL (ref 40–60)
HEMOGLOBIN: 12.5 G/DL (ref 11.5–16.5)
IMMATURE GRANULOCYTES #: 0.1 K/UL
IMMATURE GRANULOCYTES %: 0.5 % (ref 0–5)
LDL CHOLESTEROL CALCULATED: 18 MG/DL
LYMPHOCYTES ABSOLUTE: 3.2 K/UL (ref 1.5–4)
LYMPHOCYTES RELATIVE PERCENT: 26.2 %
MCH RBC QN AUTO: 30.6 PG (ref 27–32)
MCHC RBC AUTO-ENTMCNC: 31.6 G/DL (ref 31–35)
MCV RBC AUTO: 96.6 FL (ref 80–100)
MONOCYTES ABSOLUTE: 0.7 K/UL (ref 0.2–0.8)
MONOCYTES RELATIVE PERCENT: 5.7 %
NEUTROPHILS ABSOLUTE: 8 K/UL (ref 2–7.5)
NEUTROPHILS RELATIVE PERCENT: 65.9 %
PDW BLD-RTO: 15.8 % (ref 11–16)
PLATELET # BLD: 428 K/UL (ref 150–400)
PMV BLD AUTO: 10.4 FL (ref 6–10)
POTASSIUM SERPL-SCNC: 3.9 MMOL/L (ref 3.4–5.1)
RBC # BLD: 4.09 M/UL (ref 3.8–5.8)
SODIUM BLD-SCNC: 143 MMOL/L (ref 136–145)
TOTAL PROTEIN: 6 G/DL (ref 6.4–8.3)
TRIGL SERPL-MCNC: 153 MG/DL (ref 0–249)
TSH SERPL DL<=0.05 MIU/L-ACNC: 0.05 UIU/ML (ref 0.27–4.2)
VITAMIN B-12: 723 PG/ML (ref 211–911)
VLDLC SERPL CALC-MCNC: 31 MG/DL
WBC # BLD: 12.1 K/UL (ref 4–11)

## 2023-03-13 ENCOUNTER — HOSPITAL ENCOUNTER (OUTPATIENT)
Facility: HOSPITAL | Age: 63
Discharge: HOME | End: 2023-03-13
Payer: MEDICARE

## 2023-03-13 LAB
A/G RATIO: 1.2 (ref 0.8–2)
ALBUMIN SERPL-MCNC: 3.2 G/DL (ref 3.4–4.8)
ALP BLD-CCNC: 132 U/L (ref 25–100)
ALT SERPL-CCNC: 16 U/L (ref 4–36)
ANION GAP SERPL CALCULATED.3IONS-SCNC: 10 MMOL/L (ref 3–16)
AST SERPL-CCNC: 16 U/L (ref 8–33)
BASOPHILS ABSOLUTE: 0 K/UL (ref 0–0.1)
BASOPHILS RELATIVE PERCENT: 0.4 %
BILIRUB SERPL-MCNC: 0.4 MG/DL (ref 0.3–1.2)
BUN BLDV-MCNC: 14 MG/DL (ref 6–20)
CALCIUM SERPL-MCNC: 8.2 MG/DL (ref 8.5–10.5)
CHLORIDE BLD-SCNC: 115 MMOL/L (ref 98–107)
CHOLESTEROL, TOTAL: 90 MG/DL (ref 0–200)
CO2: 19 MMOL/L (ref 20–30)
CREAT SERPL-MCNC: 0.7 MG/DL (ref 0.4–1.2)
EOSINOPHILS ABSOLUTE: 0.1 K/UL (ref 0–0.4)
EOSINOPHILS RELATIVE PERCENT: 1.3 %
FOLATE: >20 NG/ML
GFR SERPL CREATININE-BSD FRML MDRD: >60 ML/MIN/{1.73_M2}
GLOBULIN: 2.6 G/DL
GLUCOSE BLD-MCNC: 106 MG/DL (ref 74–106)
HCT VFR BLD CALC: 39.7 % (ref 37–47)
HDLC SERPL-MCNC: 41 MG/DL (ref 40–60)
HEMOGLOBIN: 12.4 G/DL (ref 11.5–16.5)
IMMATURE GRANULOCYTES #: 0.1 K/UL
IMMATURE GRANULOCYTES %: 0.7 % (ref 0–5)
LDL CHOLESTEROL CALCULATED: 19 MG/DL
LYMPHOCYTES ABSOLUTE: 3.4 K/UL (ref 1.5–4)
LYMPHOCYTES RELATIVE PERCENT: 36.8 %
MCH RBC QN AUTO: 31.6 PG (ref 27–32)
MCHC RBC AUTO-ENTMCNC: 31.2 G/DL (ref 31–35)
MCV RBC AUTO: 101.3 FL (ref 80–100)
MONOCYTES ABSOLUTE: 0.5 K/UL (ref 0.2–0.8)
MONOCYTES RELATIVE PERCENT: 5.5 %
NEUTROPHILS ABSOLUTE: 5 K/UL (ref 2–7.5)
NEUTROPHILS RELATIVE PERCENT: 55.3 %
PDW BLD-RTO: 15.6 % (ref 11–16)
PLATELET # BLD: 358 K/UL (ref 150–400)
PMV BLD AUTO: 9.4 FL (ref 6–10)
POTASSIUM SERPL-SCNC: 4.3 MMOL/L (ref 3.4–5.1)
RBC # BLD: 3.92 M/UL (ref 3.8–5.8)
SODIUM BLD-SCNC: 144 MMOL/L (ref 136–145)
TOTAL PROTEIN: 5.8 G/DL (ref 6.4–8.3)
TRIGL SERPL-MCNC: 152 MG/DL (ref 0–249)
TSH SERPL DL<=0.05 MIU/L-ACNC: 0.18 UIU/ML (ref 0.27–4.2)
VITAMIN B-12: 259 PG/ML (ref 211–911)
VITAMIN D 25-HYDROXY: 56.4 (ref 32–100)
VLDLC SERPL CALC-MCNC: 30 MG/DL
WBC # BLD: 9.1 K/UL (ref 4–11)

## 2023-03-13 PROCEDURE — 36415 COLL VENOUS BLD VENIPUNCTURE: CPT

## 2023-03-13 PROCEDURE — 84443 ASSAY THYROID STIM HORMONE: CPT

## 2023-03-13 PROCEDURE — 82306 VITAMIN D 25 HYDROXY: CPT

## 2023-03-13 PROCEDURE — 80053 COMPREHEN METABOLIC PANEL: CPT

## 2023-03-13 PROCEDURE — 82607 VITAMIN B-12: CPT

## 2023-03-13 PROCEDURE — 85025 COMPLETE CBC W/AUTO DIFF WBC: CPT

## 2023-03-13 PROCEDURE — 80061 LIPID PANEL: CPT

## 2023-03-13 PROCEDURE — 82746 ASSAY OF FOLIC ACID SERUM: CPT

## 2023-12-08 ENCOUNTER — HOSPITAL ENCOUNTER (OUTPATIENT)
Facility: HOSPITAL | Age: 63
Discharge: HOME | End: 2023-12-08
Payer: MEDICARE

## 2023-12-08 LAB
25(OH)D3 SERPL-MCNC: 52.3 NG/ML (ref 32–100)
ALBUMIN SERPL-MCNC: 3.4 G/DL (ref 3.4–4.8)
ALBUMIN/GLOB SERPL: 1.4 {RATIO} (ref 0.8–2)
ALP SERPL-CCNC: 129 U/L (ref 25–100)
ALT SERPL-CCNC: 53 U/L (ref 4–36)
ANION GAP SERPL CALCULATED.3IONS-SCNC: 16 MMOL/L (ref 3–16)
AST SERPL-CCNC: 32 U/L (ref 8–33)
BASOPHILS # BLD: 0 K/UL (ref 0–0.1)
BASOPHILS NFR BLD: 0.3 %
BILIRUB SERPL-MCNC: <0.2 MG/DL (ref 0.3–1.2)
BUN SERPL-MCNC: 17 MG/DL (ref 6–20)
CALCIUM SERPL-MCNC: 6.5 MG/DL (ref 8.5–10.5)
CHLORIDE SERPL-SCNC: 111 MMOL/L (ref 98–107)
CHOLEST SERPL-MCNC: 81 MG/DL (ref 0–200)
CO2 SERPL-SCNC: 17 MMOL/L (ref 20–30)
CREAT SERPL-MCNC: 0.9 MG/DL (ref 0.4–1.2)
EOSINOPHIL # BLD: 0.1 K/UL (ref 0–0.4)
EOSINOPHIL NFR BLD: 1 %
ERYTHROCYTE [DISTWIDTH] IN BLOOD BY AUTOMATED COUNT: 16 % (ref 11–16)
FOLATE SERPL-MCNC: >20 NG/ML
GFR SERPLBLD CREATININE-BSD FMLA CKD-EPI: >60 ML/MIN/{1.73_M2}
GLOBULIN SER CALC-MCNC: 2.4 G/DL
GLUCOSE SERPL-MCNC: 61 MG/DL (ref 74–106)
HBA1C MFR BLD: 4.5 %
HCT VFR BLD AUTO: 36.6 % (ref 37–47)
HDLC SERPL-MCNC: 34 MG/DL (ref 40–60)
HGB BLD-MCNC: 11.6 G/DL (ref 11.5–16.5)
IMM GRANULOCYTES # BLD: 0.1 K/UL
IMM GRANULOCYTES NFR BLD: 0.7 % (ref 0–5)
LDLC SERPL CALC-MCNC: 18 MG/DL
LYMPHOCYTES # BLD: 3.9 K/UL (ref 1.5–4)
LYMPHOCYTES NFR BLD: 33.4 %
MCH RBC QN AUTO: 31.3 PG (ref 27–32)
MCHC RBC AUTO-ENTMCNC: 31.7 G/DL (ref 31–35)
MCV RBC AUTO: 98.7 FL (ref 80–100)
MONOCYTES # BLD: 0.7 K/UL (ref 0.2–0.8)
MONOCYTES NFR BLD: 5.7 %
NEUTROPHILS # BLD: 6.8 K/UL (ref 2–7.5)
NEUTS SEG NFR BLD: 58.9 %
PLATELET # BLD AUTO: 249 K/UL (ref 150–400)
PMV BLD AUTO: 10.5 FL (ref 6–10)
POTASSIUM SERPL-SCNC: 4.1 MMOL/L (ref 3.4–5.1)
PROT SERPL-MCNC: 5.8 G/DL (ref 6.4–8.3)
RBC # BLD AUTO: 3.71 M/UL (ref 3.8–5.8)
SODIUM SERPL-SCNC: 144 MMOL/L (ref 136–145)
TRIGL SERPL-MCNC: 144 MG/DL (ref 0–249)
TSH SERPL DL<=0.005 MIU/L-ACNC: 0.61 UIU/ML (ref 0.27–4.2)
VIT B12 SERPL-MCNC: 363 PG/ML (ref 211–911)
VLDLC SERPL CALC-MCNC: 29 MG/DL
WBC # BLD AUTO: 11.5 K/UL (ref 4–11)

## 2023-12-08 PROCEDURE — 85025 COMPLETE CBC W/AUTO DIFF WBC: CPT

## 2023-12-08 PROCEDURE — 82607 VITAMIN B-12: CPT

## 2023-12-08 PROCEDURE — 83036 HEMOGLOBIN GLYCOSYLATED A1C: CPT

## 2023-12-08 PROCEDURE — 84443 ASSAY THYROID STIM HORMONE: CPT

## 2023-12-08 PROCEDURE — 80061 LIPID PANEL: CPT

## 2023-12-08 PROCEDURE — 82746 ASSAY OF FOLIC ACID SERUM: CPT

## 2023-12-08 PROCEDURE — 82306 VITAMIN D 25 HYDROXY: CPT

## 2023-12-08 PROCEDURE — 80053 COMPREHEN METABOLIC PANEL: CPT

## 2024-05-08 ENCOUNTER — HOSPITAL ENCOUNTER (OUTPATIENT)
Facility: HOSPITAL | Age: 64
Discharge: HOME | End: 2024-05-08
Payer: MEDICARE

## 2024-05-08 LAB
25(OH)D3 SERPL-MCNC: 66.2 NG/ML (ref 32–100)
ALBUMIN SERPL-MCNC: 4.3 G/DL (ref 3.4–4.8)
ALBUMIN/GLOB SERPL: 1.7 {RATIO} (ref 0.8–2)
ALP SERPL-CCNC: 138 U/L (ref 25–100)
ALT SERPL-CCNC: 40 U/L (ref 4–36)
ANION GAP SERPL CALCULATED.3IONS-SCNC: 14 MMOL/L (ref 3–16)
AST SERPL-CCNC: 29 U/L (ref 8–33)
BASOPHILS # BLD: 0 K/UL (ref 0–0.1)
BASOPHILS NFR BLD: 0.4 %
BILIRUB SERPL-MCNC: <0.2 MG/DL (ref 0.3–1.2)
BUN SERPL-MCNC: 16 MG/DL (ref 6–20)
CALCIUM SERPL-MCNC: 8.7 MG/DL (ref 8.5–10.5)
CHLORIDE SERPL-SCNC: 110 MMOL/L (ref 98–107)
CO2 SERPL-SCNC: 16 MMOL/L (ref 20–30)
CREAT SERPL-MCNC: 1 MG/DL (ref 0.4–1.2)
CRP SERPL-MCNC: 13.5 MG/L (ref 0–5.1)
EOSINOPHIL # BLD: 0.3 K/UL (ref 0–0.4)
EOSINOPHIL NFR BLD: 2.6 %
ERYTHROCYTE [DISTWIDTH] IN BLOOD BY AUTOMATED COUNT: 15 % (ref 11–16)
GFR SERPLBLD CREATININE-BSD FMLA CKD-EPI: 64 ML/MIN/{1.73_M2}
GLOBULIN SER CALC-MCNC: 2.6 G/DL
GLUCOSE SERPL-MCNC: 85 MG/DL (ref 74–106)
HBA1C MFR BLD: 5.3 %
HCT VFR BLD AUTO: 37.1 % (ref 37–47)
HGB BLD-MCNC: 11.7 G/DL (ref 11.5–16.5)
IMM GRANULOCYTES # BLD: 0.1 K/UL
IMM GRANULOCYTES NFR BLD: 0.9 % (ref 0–5)
IRON SERPL-MCNC: 75 UG/DL (ref 37–145)
LYMPHOCYTES # BLD: 3.8 K/UL (ref 1.5–4)
LYMPHOCYTES NFR BLD: 37.8 %
MCH RBC QN AUTO: 31 PG (ref 27–32)
MCHC RBC AUTO-ENTMCNC: 31.5 G/DL (ref 31–35)
MCV RBC AUTO: 98.1 FL (ref 80–100)
MONOCYTES # BLD: 0.6 K/UL (ref 0.2–0.8)
MONOCYTES NFR BLD: 5.8 %
NEUTROPHILS # BLD: 5.3 K/UL (ref 2–7.5)
NEUTS SEG NFR BLD: 52.5 %
PLATELET # BLD AUTO: 314 K/UL (ref 150–400)
PMV BLD AUTO: 10.1 FL (ref 6–10)
POTASSIUM SERPL-SCNC: 4.5 MMOL/L (ref 3.4–5.1)
PROT SERPL-MCNC: 6.9 G/DL (ref 6.4–8.3)
RBC # BLD AUTO: 3.78 M/UL (ref 3.8–5.8)
SODIUM SERPL-SCNC: 140 MMOL/L (ref 136–145)
VIT B12 SERPL-MCNC: 343 PG/ML (ref 211–911)
WBC # BLD AUTO: 10 K/UL (ref 4–11)

## 2024-05-08 PROCEDURE — G0480 DRUG TEST DEF 1-7 CLASSES: HCPCS

## 2024-05-08 PROCEDURE — 82306 VITAMIN D 25 HYDROXY: CPT

## 2024-05-08 PROCEDURE — 83540 ASSAY OF IRON: CPT

## 2024-05-08 PROCEDURE — 85025 COMPLETE CBC W/AUTO DIFF WBC: CPT

## 2024-05-08 PROCEDURE — 36415 COLL VENOUS BLD VENIPUNCTURE: CPT

## 2024-05-08 PROCEDURE — 80053 COMPREHEN METABOLIC PANEL: CPT

## 2024-05-08 PROCEDURE — 82607 VITAMIN B-12: CPT

## 2024-05-08 PROCEDURE — 86140 C-REACTIVE PROTEIN: CPT

## 2024-05-08 PROCEDURE — 83036 HEMOGLOBIN GLYCOSYLATED A1C: CPT

## 2024-05-12 LAB
COTININE SERPL-MCNC: 65 NG/ML
NICOTINE SERPL-MCNC: 29 NG/ML

## 2025-04-24 ENCOUNTER — HOSPITAL ENCOUNTER (OUTPATIENT)
Facility: HOSPITAL | Age: 65
Discharge: HOME | End: 2025-04-24

## 2025-04-24 ENCOUNTER — APPOINTMENT (OUTPATIENT)
Dept: CT IMAGING | Facility: HOSPITAL | Age: 65
End: 2025-04-24
Attending: EMERGENCY MEDICINE
Payer: MEDICARE

## 2025-04-24 ENCOUNTER — HOSPITAL ENCOUNTER (EMERGENCY)
Facility: HOSPITAL | Age: 65
Discharge: HOME | End: 2025-04-24
Attending: EMERGENCY MEDICINE
Payer: MEDICARE

## 2025-04-24 ENCOUNTER — APPOINTMENT (OUTPATIENT)
Dept: GENERAL RADIOLOGY | Facility: HOSPITAL | Age: 65
End: 2025-04-24
Attending: EMERGENCY MEDICINE
Payer: MEDICARE

## 2025-04-24 VITALS
TEMPERATURE: 98.7 F | BODY MASS INDEX: 31.1 KG/M2 | WEIGHT: 169 LBS | OXYGEN SATURATION: 97 % | SYSTOLIC BLOOD PRESSURE: 119 MMHG | RESPIRATION RATE: 20 BRPM | DIASTOLIC BLOOD PRESSURE: 55 MMHG | HEIGHT: 62 IN | HEART RATE: 87 BPM

## 2025-04-24 DIAGNOSIS — E86.0 DEHYDRATION: Primary | ICD-10-CM

## 2025-04-24 DIAGNOSIS — D64.9 ANEMIA, UNSPECIFIED TYPE: ICD-10-CM

## 2025-04-24 LAB
ALBUMIN SERPL-MCNC: 2.8 G/DL (ref 3.4–4.8)
ALBUMIN/GLOB SERPL: 0.7 {RATIO} (ref 0.8–2)
ALP SERPL-CCNC: 185 U/L (ref 25–100)
ALT SERPL-CCNC: 31 U/L (ref 4–36)
ANION GAP SERPL CALCULATED.3IONS-SCNC: 11 MMOL/L (ref 3–16)
AST SERPL-CCNC: 34 U/L (ref 8–33)
BASOPHILS # BLD: 0 K/UL (ref 0–0.1)
BASOPHILS NFR BLD: 0.4 %
BILIRUB SERPL-MCNC: <0.2 MG/DL (ref 0.3–1.2)
BILIRUB UR QL STRIP.AUTO: NEGATIVE
BUN SERPL-MCNC: 34 MG/DL (ref 6–20)
CALCIUM SERPL-MCNC: 8.9 MG/DL (ref 8.3–10.6)
CHLORIDE SERPL-SCNC: 103 MMOL/L (ref 98–107)
CLARITY UR: ABNORMAL
CO2 SERPL-SCNC: 25 MMOL/L (ref 20–30)
COLOR UR: YELLOW
CREAT SERPL-MCNC: 0.9 MG/DL (ref 0.4–1.2)
EOSINOPHIL # BLD: 0 K/UL (ref 0–0.4)
EOSINOPHIL NFR BLD: 0.5 %
EPI CELLS #/AREA URNS HPF: ABNORMAL /HPF (ref 0–5)
ERYTHROCYTE [DISTWIDTH] IN BLOOD BY AUTOMATED COUNT: 18.2 % (ref 11–16)
FLUAV AG NPH QL: NEGATIVE
FLUBV AG NPH QL: NEGATIVE
GFR SERPLBLD CREATININE-BSD FMLA CKD-EPI: 72 ML/MIN/{1.73_M2}
GLOBULIN SER CALC-MCNC: 4.2 G/DL
GLUCOSE SERPL-MCNC: 104 MG/DL (ref 74–106)
GLUCOSE UR STRIP.AUTO-MCNC: NEGATIVE MG/DL
HCT VFR BLD AUTO: 28.3 % (ref 37–47)
HGB BLD-MCNC: 8.5 G/DL (ref 11.5–16.5)
HGB UR QL STRIP.AUTO: ABNORMAL
IMM GRANULOCYTES # BLD: 0.1 K/UL
IMM GRANULOCYTES NFR BLD: 0.6 % (ref 0–5)
KETONES UR STRIP.AUTO-MCNC: NEGATIVE MG/DL
LACTATE BLDV-SCNC: 1.8 MMOL/L (ref 0.4–1.9)
LEUKOCYTE ESTERASE UR QL STRIP.AUTO: NEGATIVE
LYMPHOCYTES # BLD: 2.3 K/UL (ref 1.5–4)
LYMPHOCYTES NFR BLD: 29.3 %
MCH RBC QN AUTO: 26.1 PG (ref 27–32)
MCHC RBC AUTO-ENTMCNC: 30 G/DL (ref 31–35)
MCV RBC AUTO: 86.8 FL (ref 80–100)
MONOCYTES # BLD: 0.6 K/UL (ref 0.2–0.8)
MONOCYTES NFR BLD: 7.8 %
MUCOUS THREADS URNS QL MICRO: ABNORMAL /LPF
NEUTROPHILS # BLD: 4.9 K/UL (ref 2–7.5)
NEUTS SEG NFR BLD: 61.4 %
NITRITE UR QL STRIP.AUTO: NEGATIVE
PH UR STRIP.AUTO: 6 [PH] (ref 5–8)
PLATELET # BLD AUTO: 191 K/UL (ref 150–400)
PMV BLD AUTO: 9.2 FL (ref 6–10)
POTASSIUM SERPL-SCNC: 4.2 MMOL/L (ref 3.4–5.1)
PROCALCITONIN SERPL IA-MCNC: 0.24 NG/ML (ref 0–0.15)
PROT SERPL-MCNC: 7 G/DL (ref 6.4–8.3)
PROT UR STRIP.AUTO-MCNC: 30 MG/DL
RBC # BLD AUTO: 3.26 M/UL (ref 3.8–5.8)
RBC #/AREA URNS HPF: ABNORMAL /HPF (ref 0–4)
RENAL EPI CELLS #/AREA URNS HPF: ABNORMAL /HPF (ref 0–1)
SARS-COV-2 RDRP RESP QL NAA+PROBE: NOT DETECTED
SODIUM SERPL-SCNC: 139 MMOL/L (ref 136–145)
SP GR UR STRIP.AUTO: 1.02 (ref 1–1.03)
UA COMPLETE W REFLEX CULTURE PNL UR: ABNORMAL
UA DIPSTICK W REFLEX MICRO PNL UR: YES
URN SPEC COLLECT METH UR: ABNORMAL
UROBILINOGEN UR STRIP-ACNC: 0.2 E.U./DL
WBC # BLD AUTO: 8 K/UL (ref 4–11)
WBC #/AREA URNS HPF: ABNORMAL /HPF (ref 0–5)

## 2025-04-24 PROCEDURE — 6360000004 HC RX CONTRAST MEDICATION: Performed by: EMERGENCY MEDICINE

## 2025-04-24 PROCEDURE — 87804 INFLUENZA ASSAY W/OPTIC: CPT

## 2025-04-24 PROCEDURE — 99285 EMERGENCY DEPT VISIT HI MDM: CPT

## 2025-04-24 PROCEDURE — 36415 COLL VENOUS BLD VENIPUNCTURE: CPT

## 2025-04-24 PROCEDURE — 80053 COMPREHEN METABOLIC PANEL: CPT

## 2025-04-24 PROCEDURE — 74177 CT ABD & PELVIS W/CONTRAST: CPT

## 2025-04-24 PROCEDURE — 87635 SARS-COV-2 COVID-19 AMP PRB: CPT

## 2025-04-24 PROCEDURE — 87186 SC STD MICRODIL/AGAR DIL: CPT

## 2025-04-24 PROCEDURE — 87077 CULTURE AEROBIC IDENTIFY: CPT

## 2025-04-24 PROCEDURE — 84145 PROCALCITONIN (PCT): CPT

## 2025-04-24 PROCEDURE — 85025 COMPLETE CBC W/AUTO DIFF WBC: CPT

## 2025-04-24 PROCEDURE — 71045 X-RAY EXAM CHEST 1 VIEW: CPT

## 2025-04-24 PROCEDURE — 87040 BLOOD CULTURE FOR BACTERIA: CPT

## 2025-04-24 PROCEDURE — 87150 DNA/RNA AMPLIFIED PROBE: CPT

## 2025-04-24 PROCEDURE — 81001 URINALYSIS AUTO W/SCOPE: CPT

## 2025-04-24 PROCEDURE — 93005 ELECTROCARDIOGRAM TRACING: CPT

## 2025-04-24 PROCEDURE — 94761 N-INVAS EAR/PLS OXIMETRY MLT: CPT

## 2025-04-24 PROCEDURE — 83605 ASSAY OF LACTIC ACID: CPT

## 2025-04-24 RX ORDER — IOPAMIDOL 755 MG/ML
100 INJECTION, SOLUTION INTRAVASCULAR
Status: COMPLETED | OUTPATIENT
Start: 2025-04-24 | End: 2025-04-24

## 2025-04-24 RX ADMIN — IOPAMIDOL 100 ML: 755 INJECTION, SOLUTION INTRAVENOUS at 18:28

## 2025-04-24 ASSESSMENT — LIFESTYLE VARIABLES
HOW MANY STANDARD DRINKS CONTAINING ALCOHOL DO YOU HAVE ON A TYPICAL DAY: PATIENT DOES NOT DRINK
HOW OFTEN DO YOU HAVE A DRINK CONTAINING ALCOHOL: NEVER

## 2025-04-24 ASSESSMENT — PAIN DESCRIPTION - FREQUENCY: FREQUENCY: CONTINUOUS

## 2025-04-24 ASSESSMENT — PAIN - FUNCTIONAL ASSESSMENT
PAIN_FUNCTIONAL_ASSESSMENT: ACTIVITIES ARE NOT PREVENTED
PAIN_FUNCTIONAL_ASSESSMENT: 0-10

## 2025-04-24 ASSESSMENT — PAIN DESCRIPTION - DESCRIPTORS: DESCRIPTORS: ACHING

## 2025-04-24 ASSESSMENT — PAIN DESCRIPTION - LOCATION: LOCATION: ABDOMEN

## 2025-04-24 ASSESSMENT — PAIN DESCRIPTION - PAIN TYPE: TYPE: CHRONIC PAIN

## 2025-04-24 ASSESSMENT — PAIN SCALES - GENERAL: PAINLEVEL_OUTOF10: 6

## 2025-04-24 NOTE — ED NOTES
Pt was seen at PCP today and has an order for CXR. Pt states she runs a fever everyday but is afebrile at this time. Pt also stated her PCP said she thought she should be admitted. No report was given from PCP office.

## 2025-04-24 NOTE — ED PROVIDER NOTES
.        [facility]  EMERGENCY DEPARTMENT ENCOUNTER        Pt Name: Cassidy Mireles  MRN: [id number]  Birthdate 1960  Date of evaluation: 4/24/2025  Provider: Denia Abraham MD  PCP: Lainey Galarza APRN - CNP  Note Started: 5:15 PM EDT 4/24/25    CHIEF COMPLAINT       Chief Complaint   Patient presents with    Fever     Pt states she's running fever everyday.       HISTORY OF PRESENT ILLNESS: 1 or more Elements     History from : Patient    Limitations to history : None    Cassidy Mireles is a 64 y.o. female who presents stating that she has been running fevers for the last 3 months she has undergone 3 rounds of antibiotics apparently for what was thought to be a sinus infection with no relief she has been off the last course of antibiotics for the last about a week now she states that her temperature earlier today was 100 and that she took Tylenol about 8 hours ago she is currently still afebrile.  She does have some head congestion has had a cough productive of white sputum denies any sore throat she says that she does feel a \"enlarged lymph node\" in her right anterior cervical portion of her neck she denies any shortness of breath chest pain or abdominal pain.    Nursing Notes were all reviewed and agreed with or any disagreements were addressed in the HPI.    REVIEW OF SYSTEMS :      Review of Systems     systems reviewed and negative except as in HPI/MDM    SURGICAL HISTORY     Past Surgical History:   Procedure Laterality Date    ABDOMEN SURGERY      CHOLECYSTECTOMY      CT ABSCESS DRAINAGE NECK THORAX  1/31/2025    CT ABSCESS DRAINAGE NECK THORAX 1/31/2025    HERNIA REPAIR      HYSTERECTOMY (CERVIX STATUS UNKNOWN)         CURRENTMEDICATIONS       Previous Medications    ALLOPURINOL (ZYLOPRIM) 300 MG TABLET    Take 1 tablet by mouth daily    ASPIRIN (EQ ASPIRIN ADULT LOW DOSE) 81 MG EC TABLET    Take 81 mg by mouth daily    ATORVASTATIN (LIPITOR) 20 MG

## 2025-04-26 LAB
BACTERIA BLD CULT ORG #2: ABNORMAL
BACTERIA BLD CULT: ABNORMAL
BACTERIA BLD CULT: ABNORMAL
ORGANISM: ABNORMAL
REPORT: NORMAL

## 2025-04-29 LAB
BACTERIA BLD CULT ORG #2: ABNORMAL
BACTERIA BLD CULT: ABNORMAL
ORGANISM: ABNORMAL